# Patient Record
Sex: MALE | Race: WHITE | NOT HISPANIC OR LATINO | Employment: FULL TIME | URBAN - METROPOLITAN AREA
[De-identification: names, ages, dates, MRNs, and addresses within clinical notes are randomized per-mention and may not be internally consistent; named-entity substitution may affect disease eponyms.]

---

## 2018-08-20 ENCOUNTER — OFFICE VISIT (OUTPATIENT)
Dept: INTERNAL MEDICINE | Facility: MEDICAL CENTER | Age: 38
End: 2018-08-20
Payer: COMMERCIAL

## 2018-08-20 VITALS
WEIGHT: 193.2 LBS | OXYGEN SATURATION: 96 % | DIASTOLIC BLOOD PRESSURE: 84 MMHG | BODY MASS INDEX: 27.66 KG/M2 | SYSTOLIC BLOOD PRESSURE: 124 MMHG | HEIGHT: 70 IN | RESPIRATION RATE: 18 BRPM | HEART RATE: 80 BPM | TEMPERATURE: 98.3 F

## 2018-08-20 DIAGNOSIS — Z13.21 ENCOUNTER FOR VITAMIN DEFICIENCY SCREENING: ICD-10-CM

## 2018-08-20 DIAGNOSIS — Z13.0 SCREENING FOR DEFICIENCY ANEMIA: ICD-10-CM

## 2018-08-20 DIAGNOSIS — Z13.220 SCREENING FOR HYPERLIPIDEMIA: ICD-10-CM

## 2018-08-20 DIAGNOSIS — Z00.00 ENCOUNTER FOR PREVENTIVE CARE: ICD-10-CM

## 2018-08-20 DIAGNOSIS — Z13.1 SCREENING FOR DIABETES MELLITUS (DM): ICD-10-CM

## 2018-08-20 DIAGNOSIS — J02.9 SORE THROAT: ICD-10-CM

## 2018-08-20 DIAGNOSIS — R22.1 ENLARGEMENT OF NECK: ICD-10-CM

## 2018-08-20 DIAGNOSIS — Z13.29 SCREENING FOR THYROID DISORDER: ICD-10-CM

## 2018-08-20 LAB
INT CON NEG: NORMAL
INT CON POS: NORMAL
S PYO AG THROAT QL: NEGATIVE

## 2018-08-20 PROCEDURE — 87880 STREP A ASSAY W/OPTIC: CPT | Mod: GC | Performed by: INTERNAL MEDICINE

## 2018-08-20 PROCEDURE — 99204 OFFICE O/P NEW MOD 45 MIN: CPT | Mod: GC | Performed by: INTERNAL MEDICINE

## 2018-08-20 ASSESSMENT — PAIN SCALES - GENERAL: PAINLEVEL: NO PAIN

## 2018-08-20 ASSESSMENT — PATIENT HEALTH QUESTIONNAIRE - PHQ9: CLINICAL INTERPRETATION OF PHQ2 SCORE: 0

## 2018-08-20 NOTE — PATIENT INSTRUCTIONS
-Please get your lab work done  -Order for Ultrasound of Neck/Thyroid provided, please get done  -Follow-up in a few weeks   -If signs or symptoms become worse, please seek medical attention immediately     Thyroid Diseases  Your thyroid is a butterfly-shaped gland in your neck. It is located just above your collarbone. It is one of your endocrine glands, which make hormones. The thyroid helps set your metabolism. Metabolism is how your body gets energy from the foods you eat.   Millions of people have thyroid diseases. Women experience thyroid problems more often than men. In fact, overactive thyroid problems (hyperthyroidism) occur in 1% of all women. If you have a thyroid disease, your body may use energy more slowly or quickly than it should.   Thyroid problems also include an immune disease where your body reacts against your thyroid gland (called thyroiditis). A different problem involves lumps and bumps (called nodules) that develop in the gland. The nodules are usually, but not always, noncancerous.  THE MOST COMMON THYROID PROBLEMS AND CAUSES ARE DISCUSSED BELOW  There are many causes for thyroid problems. Treatment depends upon the exact diagnosis and includes trying to reset your body's metabolism to a normal rate.  Hyperthyroidism  Too much thyroid hormone from an overactive thyroid gland is called hyperthyroidism. In hyperthyroidism, the body's metabolism speeds up. One of the most frequent forms of hyperthyroidism is known as Graves' disease. Graves' disease tends to run in families. Although Graves' is thought to be caused by a problem with the immune system, the exact nature of the genetic problem is unknown.  Hypothyroidism  Too little thyroid hormone from an underactive thyroid gland is called hypothyroidism. In hypothyroidism, the body's metabolism is slowed. Several things can cause this condition. Most causes affect the thyroid gland directly and hurt its ability to make enough hormone.    Rarely, there may be a pituitary gland tumor (located near the base of the brain). The tumor can block the pituitary from producing thyroid-stimulating hormone (TSH). Your body makes TSH to stimulate the thyroid to work properly. If the pituitary does not make enough TSH, the thyroid fails to make enough hormones needed for good health.  Whether the problem is caused by thyroid conditions or by the pituitary gland, the result is that the thyroid is not making enough hormones. Hypothyroidism causes many physical and mental processes to become sluggish. The body consumes less oxygen and produces less body heat.  Thyroid Nodules  A thyroid nodule is a small swelling or lump in the thyroid gland. They are common. These nodules represent either a growth of thyroid tissue or a fluid-filled cyst. Both form a lump in the thyroid gland. Almost half of all people will have tiny thyroid nodules at some point in their lives. Typically, these are not noticeable until they become large and affect normal thyroid size. Larger nodules that are greater than a half inch across (about 1 centimeter) occur in about 5 percent of people.  Although most nodules are not cancerous, people who have them should seek medical care to rule out cancer. Also, some thyroid nodules may produce too much thyroid hormone or become too large. Large nodules or a large gland can interfere with breathing or swallowing or may cause neck discomfort.  Other problems  Other thyroid problems include cancer and thyroiditis. Thyroiditis is a malfunction of the body's immune system. Normally, the immune system works to defend the body against infection and other problems. When the immune system is not working properly, it may mistakenly attack normal cells, tissues, and organs. Examples of autoimmune diseases are Hashimoto's thyroiditis (which causes low thyroid function) and Graves' disease (which causes excess thyroid function).  SYMPTOMS   Symptoms vary greatly  depending upon the exact type of problem with the thyroid.  Hyperthyroidism-is when your thyroid is too active and makes more thyroid hormone than your body needs. The most common cause is Graves' Disease. Too much thyroid hormone can cause some or all of the following symptoms:  · Anxiety.   · Irritability.   · Difficulty sleeping.   · Fatigue.   · A rapid or irregular heartbeat.   · A fine tremor of your hands or fingers.   · An increase in perspiration.   · Sensitivity to heat.   · Weight loss, despite normal food intake.   · Brittle hair.   · Enlargement of your thyroid gland (goiter).   · Light menstrual periods.   · Frequent bowel movements.   Graves' disease can specifically cause eye and skin problems. The skin problems involve reddening and swelling of the skin, often on your shins and on the top of your feet. Eye problems can include the following:  · Excess tearing and sensation of grit or sand in either or both eyes.   · Reddened or inflamed eyes.   · Widening of the space between your eyelids.   · Swelling of the lids and tissues around the eyes.   · Light sensitivity.   · Ulcers on the cornea.   · Double vision.   · Limited eye movements.   · Blurred or reduced vision.   Hypothyroidism- is when your thyroid gland is not active enough. This is more common than hyperthyroidism. Symptoms can vary a lot depending of the severity of the hormone deficiency. Symptoms may develop over a long period of time and can include several of the following:  · Fatigue.   · Sluggishness.   · Increased sensitivity to cold.   · Constipation.   · Pale, dry skin.   · A puffy face.   · Hoarse voice.   · High blood cholesterol level.   · Unexplained weight gain.   · Muscle aches, tenderness and stiffness.   · Pain, stiffness or swelling in your joints.   · Muscle weakness.   · Heavier than normal menstrual periods.   · Brittle fingernails and hair.   · Depression.   Thyroid Nodules - most do not cause signs or symptoms.  Occasionally, some may become so large that you can feel or even see the swelling at the base of your neck. You may realize a lump or swelling is there when you are shaving or putting on makeup. Men might become aware of a nodule when shirt collars suddenly feel too tight.  Some nodules produce too much thyroid hormone. This can produce the same symptoms as hyperthyroidism (see above).  Thyroid nodules are seldom cancerous. However, a nodule is more likely to be malignant (cancerous) if it:  · Grows quickly or feels hard.   · Causes you to become hoarse or to have trouble swallowing or breathing.   · Causes enlarged lymph nodes under your jaw or in your neck.   DIAGNOSIS   Because there are so many possible thyroid conditions, your caregiver may ask for a number of tests. They will do this in order to narrow down the exact diagnosis. These tests can include:  · Blood and antibody tests.   · Special thyroid scans using small, safe amounts of radioactive iodine.   · Ultrasound of the thyroid gland (particularly if there is a nodule or lump).   · Biopsy. This is usually done with a special needle. A needle biopsy is a procedure to obtain a sample of cells from the thyroid. The tissue will be tested in a lab and examined under a microscope.   TREATMENT   Treatment depends on the exact diagnosis.  Hyperthyroidism  · Beta-blockers help relieve many of the symptoms.   · Anti-thyroid medications prevent the thyroid from making excess hormones.   · Radioactive iodine treatment can destroy overactive thyroid cells. The iodine can permanently decrease the amount of hormone produced.   · Surgery to remove the thyroid gland.   · Treatments for eye problems that come from Graves' disease also include medications and special eye surgery, if felt to be appropriate.   Hypothyroidism  Thyroid replacement with levothyroxine is the mainstay of treatment. Treatment with thyroid replacement is usually lifelong and will require monitoring  and adjustment from time to time.  Thyroid Nodules  · Watchful waiting. If a small nodule causes no symptoms or signs of cancer on biopsy, then no treatment may be chosen at first. Re-exam and re-checking blood tests would be the recommended follow-up.   · Anti-thyroid medications or radioactive iodine treatment may be recommended if the nodules produce too much thyroid hormone (see Treatment for Hyperthyroidism above).   · Alcohol ablation. Injections of small amounts of ethyl alcohol (ethanol) can cause a non-cancerous nodule to shrink in size.   · Surgery (see Treatment for Hyperthyroidism above).   HOME CARE INSTRUCTIONS   · Take medications as instructed.   · Follow through on recommended testing.   SEEK MEDICAL CARE IF:   · You feel that you are developing symptoms of Hyperthyroidism or Hypothyroidism as described above.   · You develop a new lump/nodule in the neck/thyroid area that you had not noticed before.   · You feel that you are having side effects from medicines prescribed.   · You develop trouble breathing or swallowing.   SEEK IMMEDIATE MEDICAL CARE IF:   · You develop a fever of 102° F (38.9° C) or higher.   · You develop severe sweating.   · You develop palpitations and/or rapid heart beat.   · You develop shortness of breath.   · You develop nausea and vomiting.   · You develop extreme shakiness.   · You develop agitation.   · You develop lightheadedness or have a fainting episode.   Document Released: 10/14/2008 Document Revised: 03/11/2013 Document Reviewed: 10/14/2008  [employer] Patient Information ©2013 [employer].

## 2018-08-20 NOTE — LETTER
[facility]  Helen Daugherty M.D.  [address]  [place] [address]  Fax: [phone]   Authorization for Release/Disclosure of   Protected Health Information   Name: AJIT RED : 1980 SSN: [id number]   Address: [facility] [address]  [place] [address] Phone:    [phone] (home)    I authorize the entity listed below to release/disclose the PHI below to:   [facility]/Helen Daugherty M.D. and Helen Daugherty M.D.   Provider or Entity Name:     Address   City, State, Zip   Phone:      Fax:     Reason for request: continuity of care   Information to be released:    [  ] LAST COLONOSCOPY,  including any PATH REPORT and follow-up  [  ] LAST FIT/COLOGUARD RESULT [  ] LAST DEXA  [  ] LAST MAMMOGRAM  [  ] LAST PAP  [  ] LAST LABS [  ] RETINA EXAM REPORT  [  ] IMMUNIZATION RECORDS  [  ] Release all info      [  ] Check here and initial the line next to each item to release ALL health information INCLUDING  _____ Care and treatment for drug and / or alcohol abuse  _____ HIV testing, infection status, or AIDS  _____ Genetic Testing    DATES OF SERVICE OR TIME PERIOD TO BE DISCLOSED: _____________  I understand and acknowledge that:  * This Authorization may be revoked at any time by you in writing, except if your health information has already been used or disclosed.  * Your health information that will be used or disclosed as a result of you signing this authorization could be re-disclosed by the recipient. If this occurs, your re-disclosed health information may no longer be protected by State or Federal laws.  * You may refuse to sign this Authorization. Your refusal will not affect your ability to obtain treatment.  * This Authorization becomes effective upon signing and will  on (date) __________.      If no date is indicated, this Authorization will  one (1) year from the signature date.    Name: Ajit Red    Signature:   Date:     2018       PLEASE FAX REQUESTED RECORDS  BACK TO: [phone]

## 2018-08-29 ENCOUNTER — HOSPITAL ENCOUNTER (OUTPATIENT)
Dept: RADIOLOGY | Facility: MEDICAL CENTER | Age: 38
End: 2018-08-29
Attending: STUDENT IN AN ORGANIZED HEALTH CARE EDUCATION/TRAINING PROGRAM
Payer: COMMERCIAL

## 2018-08-29 ENCOUNTER — HOSPITAL ENCOUNTER (OUTPATIENT)
Dept: LAB | Facility: MEDICAL CENTER | Age: 38
End: 2018-08-29
Attending: STUDENT IN AN ORGANIZED HEALTH CARE EDUCATION/TRAINING PROGRAM
Payer: COMMERCIAL

## 2018-08-29 DIAGNOSIS — J02.9 SORE THROAT: ICD-10-CM

## 2018-08-29 DIAGNOSIS — Z13.21 ENCOUNTER FOR VITAMIN DEFICIENCY SCREENING: ICD-10-CM

## 2018-08-29 DIAGNOSIS — R22.1 ENLARGEMENT OF NECK: ICD-10-CM

## 2018-08-29 DIAGNOSIS — Z13.0 SCREENING FOR DEFICIENCY ANEMIA: ICD-10-CM

## 2018-08-29 DIAGNOSIS — Z13.29 SCREENING FOR THYROID DISORDER: ICD-10-CM

## 2018-08-29 DIAGNOSIS — Z13.1 SCREENING FOR DIABETES MELLITUS (DM): ICD-10-CM

## 2018-08-29 DIAGNOSIS — Z00.00 ENCOUNTER FOR PREVENTIVE CARE: ICD-10-CM

## 2018-08-29 DIAGNOSIS — Z13.220 SCREENING FOR HYPERLIPIDEMIA: ICD-10-CM

## 2018-08-29 LAB
25(OH)D3 SERPL-MCNC: 22 NG/ML (ref 30–100)
ALBUMIN SERPL BCP-MCNC: 4.3 G/DL (ref 3.2–4.9)
ALBUMIN/GLOB SERPL: 1.5 G/DL
ALP SERPL-CCNC: 80 U/L (ref 30–99)
ALT SERPL-CCNC: 28 U/L (ref 2–50)
ANION GAP SERPL CALC-SCNC: 8 MMOL/L (ref 0–11.9)
AST SERPL-CCNC: 23 U/L (ref 12–45)
BASOPHILS # BLD AUTO: 0.9 % (ref 0–1.8)
BASOPHILS # BLD: 0.06 K/UL (ref 0–0.12)
BILIRUB SERPL-MCNC: 1.5 MG/DL (ref 0.1–1.5)
BUN SERPL-MCNC: 14 MG/DL (ref 8–22)
CALCIUM SERPL-MCNC: 9 MG/DL (ref 8.5–10.5)
CHLORIDE SERPL-SCNC: 106 MMOL/L (ref 96–112)
CHOLEST SERPL-MCNC: 157 MG/DL (ref 100–199)
CO2 SERPL-SCNC: 24 MMOL/L (ref 20–33)
CREAT SERPL-MCNC: 0.94 MG/DL (ref 0.5–1.4)
EOSINOPHIL # BLD AUTO: 0.59 K/UL (ref 0–0.51)
EOSINOPHIL NFR BLD: 8.5 % (ref 0–6.9)
ERYTHROCYTE [DISTWIDTH] IN BLOOD BY AUTOMATED COUNT: 39.5 FL (ref 35.9–50)
GLOBULIN SER CALC-MCNC: 2.9 G/DL (ref 1.9–3.5)
GLUCOSE SERPL-MCNC: 84 MG/DL (ref 65–99)
HCT VFR BLD AUTO: 48.1 % (ref 42–52)
HDLC SERPL-MCNC: 34 MG/DL
HGB BLD-MCNC: 17 G/DL (ref 14–18)
HIV 1+2 AB+HIV1 P24 AG SERPL QL IA: NON REACTIVE
IMM GRANULOCYTES # BLD AUTO: 0.03 K/UL (ref 0–0.11)
IMM GRANULOCYTES NFR BLD AUTO: 0.4 % (ref 0–0.9)
LDLC SERPL CALC-MCNC: 82 MG/DL
LYMPHOCYTES # BLD AUTO: 2.05 K/UL (ref 1–4.8)
LYMPHOCYTES NFR BLD: 29.5 % (ref 22–41)
MCH RBC QN AUTO: 30.9 PG (ref 27–33)
MCHC RBC AUTO-ENTMCNC: 35.3 G/DL (ref 33.7–35.3)
MCV RBC AUTO: 87.3 FL (ref 81.4–97.8)
MONOCYTES # BLD AUTO: 0.48 K/UL (ref 0–0.85)
MONOCYTES NFR BLD AUTO: 6.9 % (ref 0–13.4)
NEUTROPHILS # BLD AUTO: 3.73 K/UL (ref 1.82–7.42)
NEUTROPHILS NFR BLD: 53.8 % (ref 44–72)
NRBC # BLD AUTO: 0 K/UL
NRBC BLD-RTO: 0 /100 WBC
PLATELET # BLD AUTO: 222 K/UL (ref 164–446)
PMV BLD AUTO: 11.6 FL (ref 9–12.9)
POTASSIUM SERPL-SCNC: 4.1 MMOL/L (ref 3.6–5.5)
PROT SERPL-MCNC: 7.2 G/DL (ref 6–8.2)
RBC # BLD AUTO: 5.51 M/UL (ref 4.7–6.1)
SODIUM SERPL-SCNC: 138 MMOL/L (ref 135–145)
TRIGL SERPL-MCNC: 206 MG/DL (ref 0–149)
TSH SERPL DL<=0.005 MIU/L-ACNC: 1.93 UIU/ML (ref 0.38–5.33)
WBC # BLD AUTO: 6.9 K/UL (ref 4.8–10.8)

## 2018-08-29 PROCEDURE — 80053 COMPREHEN METABOLIC PANEL: CPT

## 2018-08-29 PROCEDURE — 87389 HIV-1 AG W/HIV-1&-2 AB AG IA: CPT

## 2018-08-29 PROCEDURE — 80061 LIPID PANEL: CPT

## 2018-08-29 PROCEDURE — 84443 ASSAY THYROID STIM HORMONE: CPT

## 2018-08-29 PROCEDURE — 85025 COMPLETE CBC W/AUTO DIFF WBC: CPT

## 2018-08-29 PROCEDURE — 82306 VITAMIN D 25 HYDROXY: CPT

## 2018-08-29 PROCEDURE — 76536 US EXAM OF HEAD AND NECK: CPT

## 2018-08-29 PROCEDURE — 36415 COLL VENOUS BLD VENIPUNCTURE: CPT

## 2018-10-09 ENCOUNTER — OFFICE VISIT (OUTPATIENT)
Dept: INTERNAL MEDICINE | Facility: MEDICAL CENTER | Age: 38
End: 2018-10-09
Payer: COMMERCIAL

## 2018-10-09 VITALS
HEIGHT: 70 IN | BODY MASS INDEX: 28.26 KG/M2 | TEMPERATURE: 98 F | OXYGEN SATURATION: 94 % | WEIGHT: 197.4 LBS | HEART RATE: 69 BPM | SYSTOLIC BLOOD PRESSURE: 110 MMHG | DIASTOLIC BLOOD PRESSURE: 73 MMHG

## 2018-10-09 DIAGNOSIS — E55.9 VITAMIN D DEFICIENCY: ICD-10-CM

## 2018-10-09 DIAGNOSIS — E78.1 HIGH TRIGLYCERIDES: ICD-10-CM

## 2018-10-09 PROCEDURE — 99214 OFFICE O/P EST MOD 30 MIN: CPT | Mod: GC | Performed by: INTERNAL MEDICINE

## 2018-10-09 ASSESSMENT — PAIN SCALES - GENERAL: PAINLEVEL: NO PAIN

## 2018-10-09 NOTE — PATIENT INSTRUCTIONS
-Please take Vit D supplement 2000 units daily   -Cut hard carb foods from diet, avoid energy drinks, sodas, beverages with sugar as much as possible  - Watch diet- cut red meats, donuts, and also increase exercise regime as this will help with lipid levels    Fat and Cholesterol Restricted Diet  High levels of fat and cholesterol in your blood may lead to various health problems, such as diseases of the heart, blood vessels, gallbladder, liver, and pancreas. Fats are concentrated sources of energy that come in various forms. Certain types of fat, including saturated fat, may be harmful in excess. Cholesterol is a substance needed by your body in small amounts. Your body makes all the cholesterol it needs. Excess cholesterol comes from the food you eat.  When you have high levels of cholesterol and saturated fat in your blood, health problems can develop because the excess fat and cholesterol will gather along the walls of your blood vessels, causing them to narrow. Choosing the right foods will help you control your intake of fat and cholesterol. This will help keep the levels of these substances in your blood within normal limits and reduce your risk of disease.  WHAT IS MY PLAN?  Your health care provider recommends that you:  · Get no more than __________ % of the total calories in your daily diet from fat.  · Limit your intake of saturated fat to less than ______% of your total calories each day.  · Limit the amount of cholesterol in your diet to less than _________mg per day.  WHAT TYPES OF FAT SHOULD I CHOOSE?  · Choose healthy fats more often. Choose monounsaturated and polyunsaturated fats, such as olive and canola oil, flaxseeds, walnuts, almonds, and seeds.  · Eat more omega-3 fats. Good choices include salmon, mackerel, sardines, tuna, flaxseed oil, and ground flaxseeds. Aim to eat fish at least two times a week.  · Limit saturated fats. Saturated fats are primarily found in animal products, such as  "meats, butter, and cream. Plant sources of saturated fats include palm oil, palm kernel oil, and coconut oil.  · Avoid foods with partially hydrogenated oils in them. These contain trans fats. Examples of foods that contain trans fats are stick margarine, some tub margarines, cookies, crackers, and other baked goods.  WHAT GENERAL GUIDELINES DO I NEED TO FOLLOW?  These guidelines for healthy eating will help you control your intake of fat and cholesterol:  · Check food labels carefully to identify foods with trans fats or high amounts of saturated fat.  · Fill one half of your plate with vegetables and green salads.  · Fill one fourth of your plate with whole grains. Look for the word \"whole\" as the first word in the ingredient list.  · Fill one fourth of your plate with lean protein foods.  · Limit fruit to two servings a day. Choose fruit instead of juice.  · Eat more foods that contain soluble fiber. Examples of foods that contain this type of fiber are apples, broccoli, carrots, beans, peas, and barley. Aim to get 20-30 g of fiber per day.  · Eat more home-cooked food and less restaurant, buffet, and fast food.  · Limit or avoid alcohol.  · Limit foods high in starch and sugar.  · Limit fried foods.  · Cook foods using methods other than frying. Baking, boiling, grilling, and broiling are all great options.  · Lose weight if you are overweight. Losing just 5-10% of your initial body weight can help your overall health and prevent diseases such as diabetes and heart disease.  WHAT FOODS CAN I EAT?  Grains  Whole grains, such as whole wheat or whole grain breads, crackers, cereals, and pasta. Unsweetened oatmeal, bulgur, barley, quinoa, or brown rice. Corn or whole wheat flour tortillas.  Vegetables  Fresh or frozen vegetables (raw, steamed, roasted, or grilled). Green salads.  Fruits  All fresh, canned (in natural juice), or frozen fruits.  Meat and Other Protein Products  Ground beef (85% or leaner), grass-fed " beef, or beef trimmed of fat. Skinless chicken or turkey. Ground chicken or turkey. Pork trimmed of fat. All fish and seafood. Eggs. Dried beans, peas, or lentils. Unsalted nuts or seeds. Unsalted canned or dry beans.  Dairy  Low-fat dairy products, such as skim or 1% milk, 2% or reduced-fat cheeses, low-fat ricotta or cottage cheese, or plain low-fat yogurt.  Fats and Oils  Tub margarines without trans fats. Light or reduced-fat mayonnaise and salad dressings. Avocado. Olive, canola, sesame, or safflower oils. Natural peanut or almond butter (choose ones without added sugar and oil).  The items listed above may not be a complete list of recommended foods or beverages. Contact your dietitian for more options.  WHAT FOODS ARE NOT RECOMMENDED?  Grains  White bread. White pasta. White rice. Cornbread. Bagels, pastries, and croissants. Crackers that contain trans fat.  Vegetables  White potatoes. Corn. Creamed or fried vegetables. Vegetables in a cheese sauce.  Fruits  Dried fruits. Canned fruit in light or heavy syrup. Fruit juice.  Meat and Other Protein Products  Fatty cuts of meat. Ribs, chicken wings, snow, sausage, bologna, salami, chitterlings, fatback, hot dogs, bratwurst, and packaged luncheon meats. Liver and organ meats.  Dairy  Whole or 2% milk, cream, half-and-half, and cream cheese. Whole milk cheeses. Whole-fat or sweetened yogurt. Full-fat cheeses. Nondairy creamers and whipped toppings. Processed cheese, cheese spreads, or cheese curds.  Sweets and Desserts  Corn syrup, sugars, honey, and molasses. Candy. Jam and jelly. Syrup. Sweetened cereals. Cookies, pies, cakes, donuts, muffins, and ice cream.  Fats and Oils  Butter, stick margarine, lard, shortening, ghee, or snow fat. Coconut, palm kernel, or palm oils.  Beverages  Alcohol. Sweetened drinks (such as sodas, lemonade, and fruit drinks or punches).  The items listed above may not be a complete list of foods and beverages to avoid. Contact your  dietitian for more information.     This information is not intended to replace advice given to you by your health care provider. Make sure you discuss any questions you have with your health care provider.     Document Released: 12/18/2006 Document Revised: 01/08/2016 Document Reviewed: 03/18/2015  [employer] Interactive Patient Education ©2016 [employer]      Heart-Healthy Eating Plan  Many factors influence your heart health, including eating and exercise habits. Heart (coronary) risk increases with abnormal blood fat (lipid) levels. Heart-healthy meal planning includes limiting unhealthy fats, increasing healthy fats, and making other small dietary changes. This includes maintaining a healthy body weight to help keep lipid levels within a normal range.  What is my plan?  Your health care provider recommends that you:  · Get no more than _________% of the total calories in your daily diet from fat.  · Limit your intake of saturated fat to less than _________% of your total calories each day.  · Limit the amount of cholesterol in your diet to less than _________ mg per day.  What types of fat should I choose?  · Choose healthy fats more often. Choose monounsaturated and polyunsaturated fats, such as olive oil and canola oil, flaxseeds, walnuts, almonds, and seeds.  · Eat more omega-3 fats. Good choices include salmon, mackerel, sardines, tuna, flaxseed oil, and ground flaxseeds. Aim to eat fish at least two times each week.  · Limit saturated fats. Saturated fats are primarily found in animal products, such as meats, butter, and cream. Plant sources of saturated fats include palm oil, palm kernel oil, and coconut oil.  · Avoid foods with partially hydrogenated oils in them. These contain trans fats. Examples of foods that contain trans fats are stick margarine, some tub margarines, cookies, crackers, and other baked goods.  What general guidelines do I need to follow?  · Check food labels carefully to identify  "foods with trans fats or high amounts of saturated fat.  · Fill one half of your plate with vegetables and green salads. Eat 4-5 servings of vegetables per day. A serving of vegetables equals 1 cup of raw leafy vegetables, ½ cup of raw or cooked cut-up vegetables, or ½ cup of vegetable juice.  · Fill one fourth of your plate with whole grains. Look for the word \"whole\" as the first word in the ingredient list.  · Fill one fourth of your plate with lean protein foods.  · Eat 4-5 servings of fruit per day. A serving of fruit equals one medium whole fruit, ¼ cup of dried fruit, ½ cup of fresh, frozen, or canned fruit, or ½ cup of 100% fruit juice.  · Eat more foods that contain soluble fiber. Examples of foods that contain this type of fiber are apples, broccoli, carrots, beans, peas, and barley. Aim to get 20-30 g of fiber per day.  · Eat more home-cooked food and less restaurant, buffet, and fast food.  · Limit or avoid alcohol.  · Limit foods that are high in starch and sugar.  · Avoid fried foods.  · Cook foods by using methods other than frying. Baking, boiling, grilling, and broiling are all great options. Other fat-reducing suggestions include:  ¨ Removing the skin from poultry.  ¨ Removing all visible fats from meats.  ¨ Skimming the fat off of stews, soups, and gravies before serving them.  ¨ Steaming vegetables in water or broth.  · Lose weight if you are overweight. Losing just 5-10% of your initial body weight can help your overall health and prevent diseases such as diabetes and heart disease.  · Increase your consumption of nuts, legumes, and seeds to 4-5 servings per week. One serving of dried beans or legumes equals ½ cup after being cooked, one serving of nuts equals 1½ ounces, and one serving of seeds equals ½ ounce or 1 tablespoon.  · You may need to monitor your salt (sodium) intake, especially if you have high blood pressure. Talk with your health care provider or dietitian to get more information " about reducing sodium.  What foods can I eat?  Grains   Breads, including Lebanese, white, maureen, wheat, raisin, rye, oatmeal, and Italian. Tortillas that are neither fried nor made with lard or trans fat. Low-fat rolls, including hotdog and hamburger buns and English muffins. Biscuits. Muffins. Waffles. Pancakes. Light popcorn. Whole-grain cereals. Flatbread. Shabnam toast. Pretzels. Breadsticks. Rusks. Low-fat snacks and crackers, including oyster, saltine, matzo, ana, animal, and rye. Rice and pasta, including brown rice and those that are made with whole wheat.  Vegetables   All vegetables.  Fruits   All fruits, but limit coconut.  Meats and Other Protein Sources   Lean, well-trimmed beef, veal, pork, and lamb. Chicken and turkey without skin. All fish and shellfish. Wild duck, rabbit, pheasant, and venison. Egg whites or low-cholesterol egg substitutes. Dried beans, peas, lentils, and tofu. Seeds and most nuts.  Dairy   Low-fat or nonfat cheeses, including ricotta, string, and mozzarella. Skim or 1% milk that is liquid, powdered, or evaporated. Buttermilk that is made with low-fat milk. Nonfat or low-fat yogurt.  Beverages   Mineral water. Diet carbonated beverages.  Sweets and Desserts   Sherbets and fruit ices. Honey, jam, marmalade, jelly, and syrups. Meringues and gelatins. Pure sugar candy, such as hard candy, jelly beans, gumdrops, mints, marshmallows, and small amounts of dark chocolate. Fred food cake.  Eat all sweets and desserts in moderation.  Fats and Oils   Nonhydrogenated (trans-free) margarines. Vegetable oils, including soybean, sesame, sunflower, olive, peanut, safflower, corn, canola, and cottonseed. Salad dressings or mayonnaise that are made with a vegetable oil. Limit added fats and oils that you use for cooking, baking, salads, and as spreads.  Other   Cocoa powder. Coffee and tea. All seasonings and condiments.  The items listed above may not be a complete list of recommended foods or  beverages. Contact your dietitian for more options.   What foods are not recommended?  Grains   Breads that are made with saturated or trans fats, oils, or whole milk. Croissants. Butter rolls. Cheese breads. Sweet rolls. Donuts. Buttered popcorn. Chow mein noodles. High-fat crackers, such as cheese or butter crackers.  Meats and Other Protein Sources   Fatty meats, such as hotdogs, short ribs, sausage, spareribs, snow, ribeye roast or steak, and mutton. High-fat deli meats, such as salami and bologna. Caviar. Domestic duck and goose. Organ meats, such as kidney, liver, sweetbreads, brains, gizzard, chitterlings, and heart.  Dairy   Cream, sour cream, cream cheese, and creamed cottage cheese. Whole milk cheeses, including blue (donte), [place], Brie, Viet, American, [place]i, Swiss, cheddar, Camembert, and [place]. Whole or 2% milk that is liquid, evaporated, or condensed. Whole buttermilk. Cream sauce or high-fat cheese sauce. Yogurt that is made from whole milk.  Beverages   Regular sodas and drinks with added sugar.  Sweets and Desserts   Frosting. Pudding. Cookies. Cakes other than alma food cake. Candy that has milk chocolate or white chocolate, hydrogenated fat, butter, coconut, or unknown ingredients. Buttered syrups. Full-fat ice cream or ice cream drinks.  Fats and Oils   Gravy that has suet, meat fat, or shortening. Cocoa butter, hydrogenated oils, palm oil, coconut oil, palm kernel oil. These can often be found in baked products, candy, fried foods, nondairy creamers, and whipped toppings. Solid fats and shortenings, including snow fat, salt pork, lard, and butter. Nondairy cream substitutes, such as coffee creamers and sour cream substitutes. Salad dressings that are made of unknown oils, cheese, or sour cream.  The items listed above may not be a complete list of foods and beverages to avoid. Contact your dietitian for more information.   This information is not intended to replace advice given  to you by your health care provider. Make sure you discuss any questions you have with your health care provider.  Document Released: 09/26/2009 Document Revised: 07/07/2017 Document Reviewed: 06/11/2015  [employer] Interactive Patient Education © 2017 [employer]

## 2018-10-10 NOTE — PROGRESS NOTES
Established Patient    Semaj presents today with the following:    CC:   Patient presents for one-month follow-up visit and to go over labs      HPI:   Patient is a 38-year-old male who presents today for a one-month follow-up visit and also to go over his lab results.  At the last office visit, patient had a sore throat and also a complaint of intermittent enlargement in his neck.  Patient states that since that visit his sore throat has resolved and that there was no recurring enlargement or swellings of his neck that he noticed.  He states that otherwise he is feeling fine at this time with no other complaints.  Patient states that he is taking vitamin D supplementation for over a year and that he has recently stopped after a few months of recent use, however restarted as his recent vitamin D lab levels were low.  Discussed the importance of diet and exercise with patient, he states that he will work on his diet and will try to cut out red meat, beer, donuts and other high carb foods from his diet.  He expresses understanding at this time on the importance of diet and lifestyle modifications to help with his elevated triglyceride levels and recent lipid panel lab work.        There are no active problems to display for this patient.      No current outpatient prescriptions on file.     No current facility-administered medications for this visit.        Social History     Social History   • Marital status:      Spouse name: N/A   • Number of children: N/A   • Years of education: N/A     Occupational History   • Not on file.     Social History Main Topics   • Smoking status: Never Smoker   • Smokeless tobacco: Never Used   • Alcohol use Yes      Comment: 2-3 drinks every other week   • Drug use: No   • Sexual activity: Yes     Partners: Female      Comment: with wife     Other Topics Concern   • Not on file     Social History Narrative   • No narrative on file       Family History   Problem Relation Age of  "Onset   • Depression Mother    • Hyperlipidemia Father    • Heart Disease Brother    • Cancer Maternal Grandmother    • Heart Disease Maternal Grandmother    • Hyperlipidemia Maternal Grandmother    • Depression Maternal Grandmother    • Hyperlipidemia Maternal Grandfather    • Hyperlipidemia Paternal Grandmother    • Heart Disease Paternal Grandfather    • Hyperlipidemia Paternal Grandfather        ROS: As per HPI. Additional pertinent symptoms as noted below.    All others negative    /73 (BP Location: Left arm, Patient Position: Sitting)   Pulse 69   Temp 36.7 °C (98 °F)   Ht 1.79 m (5' 10.47\")   Wt 89.5 kg (197 lb 6.4 oz)   SpO2 94%   BMI 27.95 kg/m²     Physical Exam  General:  Alert and oriented, No apparent distress.    Eyes: Pupils equal and reactive. No scleral icterus.    Throat: Clear no erythema or exudates noted.    Neck: Supple. No lymphadenopathy noted. Thyroid not enlarged.  No enlargement/swelling appreciated bilaterally in the neck.  Neck is symmetrical in size, nontender to palpation.    Lungs: Clear to auscultation and percussion bilaterally.  Normal breathing effort.    Cardiovascular: Regular rate and rhythm. No murmurs, rubs or gallops.    Abdomen:  Benign. No rebound or guarding noted.    Extremities: No clubbing, cyanosis, edema.    Skin: Clear. No rash or suspicious skin lesions noted.        Assessment and Plan    1. Vit D deficiency  - Patient has history of vitamin D deficiency,  states that he is taking vitamin D supplementation for over a year and that he has recently stopped after a few months of recent use, however restarted recently when he discovered that his vitamin D lab levels were low.  - Labwork 25 hydroxy vitamin D level was 22 (8/29/2018)  - Instructed patient to take vitamin D supplementation 2000 units daily  - Will consider vitamin D3 50,000 units if persistent low vitamin D levels after 6 months  - Continue to monitor at this time      2. High " triglycerides  - Patient's recent lipid panel lab work revealed triglycerides level of 206  - Patient's age is 38 and he has no history of heart disease so no urgent need to consider statin medication at this time  - Advised patient's to cut carbs from his diet as even simple carbohydrates will drive up triglyceride levels, to avoid energy drinks, sodas, any sugary beverages, to reduce red meat intake  - Provided patient with educational materials regarding diet and improving food choices  - Patient is in agreement at this time to continue with lifestyle modifications and work on his diet and also increase exercise  - Will continue to monitor at this time      Signed by: Helen Daugherty M.D.

## 2019-09-06 ENCOUNTER — HOSPITAL ENCOUNTER (EMERGENCY)
Facility: MEDICAL CENTER | Age: 39
End: 2019-09-07
Attending: EMERGENCY MEDICINE
Payer: COMMERCIAL

## 2019-09-06 DIAGNOSIS — R10.11 RIGHT UPPER QUADRANT ABDOMINAL PAIN: ICD-10-CM

## 2019-09-06 LAB
BASOPHILS # BLD AUTO: 0.6 % (ref 0–1.8)
BASOPHILS # BLD: 0.09 K/UL (ref 0–0.12)
EOSINOPHIL # BLD AUTO: 0.32 K/UL (ref 0–0.51)
EOSINOPHIL NFR BLD: 2.2 % (ref 0–6.9)
ERYTHROCYTE [DISTWIDTH] IN BLOOD BY AUTOMATED COUNT: 36 FL (ref 35.9–50)
HCT VFR BLD AUTO: 48.6 % (ref 42–52)
HGB BLD-MCNC: 17.7 G/DL (ref 14–18)
IMM GRANULOCYTES # BLD AUTO: 0.05 K/UL (ref 0–0.11)
IMM GRANULOCYTES NFR BLD AUTO: 0.3 % (ref 0–0.9)
LYMPHOCYTES # BLD AUTO: 2.47 K/UL (ref 1–4.8)
LYMPHOCYTES NFR BLD: 17.2 % (ref 22–41)
MCH RBC QN AUTO: 30.8 PG (ref 27–33)
MCHC RBC AUTO-ENTMCNC: 36.4 G/DL (ref 33.7–35.3)
MCV RBC AUTO: 84.7 FL (ref 81.4–97.8)
MONOCYTES # BLD AUTO: 0.69 K/UL (ref 0–0.85)
MONOCYTES NFR BLD AUTO: 4.8 % (ref 0–13.4)
NEUTROPHILS # BLD AUTO: 10.72 K/UL (ref 1.82–7.42)
NEUTROPHILS NFR BLD: 74.9 % (ref 44–72)
NRBC # BLD AUTO: 0 K/UL
NRBC BLD-RTO: 0 /100 WBC
PLATELET # BLD AUTO: 276 K/UL (ref 164–446)
PMV BLD AUTO: 10.2 FL (ref 9–12.9)
RBC # BLD AUTO: 5.74 M/UL (ref 4.7–6.1)
WBC # BLD AUTO: 14.3 K/UL (ref 4.8–10.8)

## 2019-09-06 PROCEDURE — 93005 ELECTROCARDIOGRAM TRACING: CPT | Performed by: EMERGENCY MEDICINE

## 2019-09-06 PROCEDURE — 83690 ASSAY OF LIPASE: CPT

## 2019-09-06 PROCEDURE — 85025 COMPLETE CBC W/AUTO DIFF WBC: CPT

## 2019-09-06 PROCEDURE — 93005 ELECTROCARDIOGRAM TRACING: CPT

## 2019-09-06 PROCEDURE — 99285 EMERGENCY DEPT VISIT HI MDM: CPT

## 2019-09-06 PROCEDURE — 80053 COMPREHEN METABOLIC PANEL: CPT

## 2019-09-06 PROCEDURE — 700111 HCHG RX REV CODE 636 W/ 250 OVERRIDE (IP): Performed by: EMERGENCY MEDICINE

## 2019-09-06 PROCEDURE — 96374 THER/PROPH/DIAG INJ IV PUSH: CPT

## 2019-09-06 RX ORDER — ONDANSETRON 2 MG/ML
4 INJECTION INTRAMUSCULAR; INTRAVENOUS ONCE
Status: COMPLETED | OUTPATIENT
Start: 2019-09-07 | End: 2019-09-07

## 2019-09-06 RX ADMIN — FAMOTIDINE 20 MG: 10 INJECTION INTRAVENOUS at 23:43

## 2019-09-06 ASSESSMENT — PAIN DESCRIPTION - DESCRIPTORS: DESCRIPTORS: BURNING;SHARP

## 2019-09-07 ENCOUNTER — APPOINTMENT (OUTPATIENT)
Dept: RADIOLOGY | Facility: MEDICAL CENTER | Age: 39
End: 2019-09-07
Attending: EMERGENCY MEDICINE
Payer: COMMERCIAL

## 2019-09-07 VITALS
TEMPERATURE: 98 F | DIASTOLIC BLOOD PRESSURE: 74 MMHG | BODY MASS INDEX: 26.93 KG/M2 | RESPIRATION RATE: 18 BRPM | SYSTOLIC BLOOD PRESSURE: 112 MMHG | HEIGHT: 72 IN | OXYGEN SATURATION: 96 % | WEIGHT: 198.85 LBS | HEART RATE: 61 BPM

## 2019-09-07 LAB
ALBUMIN SERPL BCP-MCNC: 4.7 G/DL (ref 3.2–4.9)
ALBUMIN/GLOB SERPL: 1.5 G/DL
ALP SERPL-CCNC: 87 U/L (ref 30–99)
ALT SERPL-CCNC: 36 U/L (ref 2–50)
ANION GAP SERPL CALC-SCNC: 15 MMOL/L (ref 0–11.9)
AST SERPL-CCNC: 22 U/L (ref 12–45)
BILIRUB SERPL-MCNC: 2 MG/DL (ref 0.1–1.5)
BUN SERPL-MCNC: 15 MG/DL (ref 8–22)
CALCIUM SERPL-MCNC: 9.7 MG/DL (ref 8.5–10.5)
CHLORIDE SERPL-SCNC: 103 MMOL/L (ref 96–112)
CO2 SERPL-SCNC: 20 MMOL/L (ref 20–33)
CREAT SERPL-MCNC: 1.07 MG/DL (ref 0.5–1.4)
EKG IMPRESSION: NORMAL
GLOBULIN SER CALC-MCNC: 3.1 G/DL (ref 1.9–3.5)
GLUCOSE SERPL-MCNC: 140 MG/DL (ref 65–99)
LIPASE SERPL-CCNC: 30 U/L (ref 11–82)
POTASSIUM SERPL-SCNC: 3.4 MMOL/L (ref 3.6–5.5)
PROT SERPL-MCNC: 7.8 G/DL (ref 6–8.2)
SODIUM SERPL-SCNC: 138 MMOL/L (ref 135–145)

## 2019-09-07 PROCEDURE — 96375 TX/PRO/DX INJ NEW DRUG ADDON: CPT

## 2019-09-07 PROCEDURE — 700111 HCHG RX REV CODE 636 W/ 250 OVERRIDE (IP): Performed by: EMERGENCY MEDICINE

## 2019-09-07 PROCEDURE — 76705 ECHO EXAM OF ABDOMEN: CPT

## 2019-09-07 RX ORDER — MORPHINE SULFATE 4 MG/ML
4 INJECTION, SOLUTION INTRAMUSCULAR; INTRAVENOUS ONCE
Status: COMPLETED | OUTPATIENT
Start: 2019-09-07 | End: 2019-09-07

## 2019-09-07 RX ORDER — FAMOTIDINE 20 MG/1
20 TABLET, FILM COATED ORAL 2 TIMES DAILY
Qty: 60 TAB | Refills: 0 | Status: SHIPPED | OUTPATIENT
Start: 2019-09-07 | End: 2019-11-05

## 2019-09-07 RX ADMIN — ONDANSETRON 4 MG: 2 INJECTION INTRAMUSCULAR; INTRAVENOUS at 00:03

## 2019-09-07 RX ADMIN — MORPHINE SULFATE 4 MG: 4 INJECTION INTRAVENOUS at 00:04

## 2019-09-07 NOTE — ED TRIAGE NOTES
"Pt to triage via w/c with family. Pt c/o \"burning/ sharp\" epigastric pain/ diffuse abd pain x 2-3 hours. + n/v, denies diarrhea or back pain. Pt taken back for EKG. Pt in obvious discomfort. Charge RN notified.     "

## 2019-09-07 NOTE — ED NOTES
Discharge instructions gone over with pt. Pt verbalized understanding. All questions answered. Pt educated on prescription given. Pt educated on s/sx to return to ED. Pt left ambulatory with steady gait.

## 2019-09-07 NOTE — ED PROVIDER NOTES
ER Provider Note     Scribed for Miguel Alonso M.D. by Margoth Ramos. 9/6/2019, 11:39 PM.    Primary Care Provider: Helen Daugherty M.D.  Means of Arrival: Walk in   History obtained from: Patient  History limited by: None     CHIEF COMPLAINT  Chief Complaint   Patient presents with   • Epigastric Pain   • Abdominal Pain       HPI  Semaj Jensen is a 39 y.o. male who presents to the Emergency Department abdominal pain onset tonight.  He has experienced similar abdominal pain happened before but attributes it to acid reflux. His wife notes that he has some associated tingling in his arms and legs. He had chinese food for dinner but nothing abnormal for him. Additionally his wife and son ate the same food with no reaction. Denies any history of abdominal surgery. Denies dysuria. He is typically very healthy.     REVIEW OF SYSTEMS  See HPI for further details. All other systems are negative.     PAST MEDICAL HISTORY   None pertinent    SURGICAL HISTORY  patient denies any surgical history    SOCIAL HISTORY  Social History     Tobacco Use   • Smoking status: Never Smoker   • Smokeless tobacco: Never Used   Substance Use Topics   • Alcohol use: Yes     Comment: 2-3 drinks every other week   • Drug use: Yes     Types: Inhaled     Comment: marijuana      Social History     Substance and Sexual Activity   Drug Use Yes   • Types: Inhaled    Comment: marijuana       FAMILY HISTORY  Family History   Problem Relation Age of Onset   • Depression Mother    • Hyperlipidemia Father    • Heart Disease Brother    • Cancer Maternal Grandmother    • Heart Disease Maternal Grandmother    • Hyperlipidemia Maternal Grandmother    • Depression Maternal Grandmother    • Hyperlipidemia Maternal Grandfather    • Hyperlipidemia Paternal Grandmother    • Heart Disease Paternal Grandfather    • Hyperlipidemia Paternal Grandfather        CURRENT MEDICATIONS  Home Medications    **Home medications have not yet been reviewed for this  encounter**         ALLERGIES  No Known Allergies    PHYSICAL EXAM  VITAL SIGNS: /80   Pulse 86   Temp 36.7 °C (98 °F) (Temporal)   Resp 20   Ht 1.829 m (6')   Wt 90.2 kg (198 lb 13.7 oz)   SpO2 100%   BMI 26.97 kg/m²      Constitutional: Mild distress. Alert in no apparent distress.  HENT: No signs of trauma, Bilateral external ears normal, Nose normal.   Eyes: Pupils are equal and reactive, Conjunctiva normal, Non-icteric.   Neck: Normal range of motion, No tenderness, Supple, No stridor.   Lymphatic: No lymphadenopathy noted.   Cardiovascular: Regular rate and rhythm, no murmurs.   Thorax & Lungs: Normal breath sounds, No respiratory distress, No wheezing, No chest tenderness.   Abdomen: right upper quadrant and epigastric tendernes with palpation.  Bowel sounds normal, Soft, No tenderness, No masses, No pulsatile masses. No peritoneal signs.  Skin: Warm, Dry, No erythema, No rash.   Back: No bony tenderness, No CVA tenderness.   Extremities: Intact distal pulses, No edema, No tenderness, No cyanosis.  Musculoskeletal: Good range of motion in all major joints. No tenderness to palpation or major deformities noted.   Neurologic: Alert , Normal motor function, Normal sensory function, No focal deficits noted.   Psychiatric: Affect normal, Judgment normal, Mood normal.     DIAGNOSTIC STUDIES / PROCEDURES    EKG Interpretation:  Interpreted by me    Rhythm:  Normal sinus rhythm   Rate: 82  Axis: normal  Ectopy: none  Conduction: normal  ST Segments: no acute change  T Waves: no acute change  Q Waves: none  Clinical Impression: Normal EKG without acute changes     LABS  Labs Reviewed   CBC WITH DIFFERENTIAL - Abnormal; Notable for the following components:       Result Value    WBC 14.3 (*)     MCHC 36.4 (*)     Neutrophils-Polys 74.90 (*)     Lymphocytes 17.20 (*)     Neutrophils (Absolute) 10.72 (*)     All other components within normal limits   COMP METABOLIC PANEL - Abnormal; Notable for the  following components:    Potassium 3.4 (*)     Anion Gap 15.0 (*)     Glucose 140 (*)     Total Bilirubin 2.0 (*)     All other components within normal limits   LIPASE   ESTIMATED GFR   URINALYSIS,CULTURE IF INDICATED     All labs reviewed by me.  US-RUQ   Final Result      1. Echogenic liver, most commonly hepatic steatosis.      2. A 4 mm focus in the gallbladder could relate to adherent stone or small polyp.               COURSE & MEDICAL DECISION MAKING  Pertinent Labs & Imaging studies reviewed. (See chart for details)    This is a 39 y.o. male that presents with abdominal pain.  This could represent pancreatitis versus gastritis versus cholecystitis.  Right upper quadrant ultrasound and treat the patient with Pepcid and Zofran and reassess per    11:39 PM - Patient seen and examined at bedside. I discussed with the patient that I think his pain is related to his gallbladder but we will due further testing to reach a diagnosis. Ordered US RUQ, estimated GFR, CBC w/ diff, CMP, lipase, UA and EKG.  Patient will be medicated with morphine 4 mg/mL, Zofran 4 mg, and Pepcid 20 mg for his symptoms.       Patient's ultrasound is negative.  Patient is well-appearing at this time.  We will send her home with Pepcid.  This could present stomach lining issues.  We will follow-up with the primary care physician.  There is some sludge in the gallbladder but otherwise he is well-appearing    FINAL IMPRESSION  1. Right upper quadrant abdominal pain          Margoth ORTEGA (David), am scribing for, and in the presence of, Miguel Alonso M.D..    Electronically signed by: Margoth Ramos (David), 9/6/2019    Miguel ORTEGA M.D. personally performed the services described in this documentation, as scribed by Margoth Ramos in my presence, and it is both accurate and complete. C    The note accurately reflects work and decisions made by me.  Miguel Alonso  9/7/2019  2:27 AM

## 2019-10-24 ENCOUNTER — HOSPITAL ENCOUNTER (OUTPATIENT)
Dept: LAB | Facility: MEDICAL CENTER | Age: 39
End: 2019-10-24
Attending: STUDENT IN AN ORGANIZED HEALTH CARE EDUCATION/TRAINING PROGRAM
Payer: COMMERCIAL

## 2019-10-24 LAB
25(OH)D3 SERPL-MCNC: 16 NG/ML (ref 30–100)
ALBUMIN SERPL BCP-MCNC: 4.5 G/DL (ref 3.2–4.9)
ALBUMIN/GLOB SERPL: 1.3 G/DL
ALP SERPL-CCNC: 90 U/L (ref 30–99)
ALT SERPL-CCNC: 39 U/L (ref 2–50)
ANION GAP SERPL CALC-SCNC: 8 MMOL/L (ref 0–11.9)
AST SERPL-CCNC: 30 U/L (ref 12–45)
BASOPHILS # BLD AUTO: 0.9 % (ref 0–1.8)
BASOPHILS # BLD: 0.06 K/UL (ref 0–0.12)
BILIRUB SERPL-MCNC: 1.8 MG/DL (ref 0.1–1.5)
BUN SERPL-MCNC: 15 MG/DL (ref 8–22)
CALCIUM SERPL-MCNC: 9.6 MG/DL (ref 8.5–10.5)
CHLORIDE SERPL-SCNC: 105 MMOL/L (ref 96–112)
CHOLEST SERPL-MCNC: 143 MG/DL (ref 100–199)
CO2 SERPL-SCNC: 26 MMOL/L (ref 20–33)
CREAT SERPL-MCNC: 0.92 MG/DL (ref 0.5–1.4)
EOSINOPHIL # BLD AUTO: 0.39 K/UL (ref 0–0.51)
EOSINOPHIL NFR BLD: 6.1 % (ref 0–6.9)
ERYTHROCYTE [DISTWIDTH] IN BLOOD BY AUTOMATED COUNT: 38.3 FL (ref 35.9–50)
FASTING STATUS PATIENT QL REPORTED: NORMAL
GLOBULIN SER CALC-MCNC: 3.4 G/DL (ref 1.9–3.5)
GLUCOSE SERPL-MCNC: 85 MG/DL (ref 65–99)
HCT VFR BLD AUTO: 48.2 % (ref 42–52)
HDLC SERPL-MCNC: 32 MG/DL
HGB BLD-MCNC: 17.3 G/DL (ref 14–18)
IMM GRANULOCYTES # BLD AUTO: 0.02 K/UL (ref 0–0.11)
IMM GRANULOCYTES NFR BLD AUTO: 0.3 % (ref 0–0.9)
LDLC SERPL CALC-MCNC: 56 MG/DL
LYMPHOCYTES # BLD AUTO: 2.27 K/UL (ref 1–4.8)
LYMPHOCYTES NFR BLD: 35.6 % (ref 22–41)
MCH RBC QN AUTO: 30.8 PG (ref 27–33)
MCHC RBC AUTO-ENTMCNC: 35.9 G/DL (ref 33.7–35.3)
MCV RBC AUTO: 85.8 FL (ref 81.4–97.8)
MONOCYTES # BLD AUTO: 0.57 K/UL (ref 0–0.85)
MONOCYTES NFR BLD AUTO: 8.9 % (ref 0–13.4)
NEUTROPHILS # BLD AUTO: 3.06 K/UL (ref 1.82–7.42)
NEUTROPHILS NFR BLD: 48.2 % (ref 44–72)
NRBC # BLD AUTO: 0 K/UL
NRBC BLD-RTO: 0 /100 WBC
PLATELET # BLD AUTO: 212 K/UL (ref 164–446)
PMV BLD AUTO: 11.3 FL (ref 9–12.9)
POTASSIUM SERPL-SCNC: 4.4 MMOL/L (ref 3.6–5.5)
PROT SERPL-MCNC: 7.9 G/DL (ref 6–8.2)
RBC # BLD AUTO: 5.62 M/UL (ref 4.7–6.1)
SODIUM SERPL-SCNC: 139 MMOL/L (ref 135–145)
TRIGL SERPL-MCNC: 276 MG/DL (ref 0–149)
WBC # BLD AUTO: 6.4 K/UL (ref 4.8–10.8)

## 2019-10-24 PROCEDURE — 84450 TRANSFERASE (AST) (SGOT): CPT

## 2019-10-24 PROCEDURE — 84460 ALANINE AMINO (ALT) (SGPT): CPT

## 2019-10-24 PROCEDURE — 87338 HPYLORI STOOL AG IA: CPT

## 2019-10-24 PROCEDURE — 80061 LIPID PANEL: CPT

## 2019-10-24 PROCEDURE — 82728 ASSAY OF FERRITIN: CPT

## 2019-10-24 PROCEDURE — 85025 COMPLETE CBC W/AUTO DIFF WBC: CPT

## 2019-10-24 PROCEDURE — 82306 VITAMIN D 25 HYDROXY: CPT

## 2019-10-24 PROCEDURE — 82947 ASSAY GLUCOSE BLOOD QUANT: CPT

## 2019-10-24 PROCEDURE — 80053 COMPREHEN METABOLIC PANEL: CPT

## 2019-10-24 PROCEDURE — 36415 COLL VENOUS BLD VENIPUNCTURE: CPT

## 2019-10-25 LAB — H PYLORI AG STL QL IA: NOT DETECTED

## 2019-10-31 LAB
ALT SERPL-CCNC: 43 U/L (ref 5–50)
ANNOTATION COMMENT IMP: NORMAL
AST SERPL-CCNC: 33 U/L (ref 9–50)
FERRITIN SERPL-MCNC: 408 NG/ML (ref 48–420)
FIBROSIS STAGE SERPL QL: NORMAL
GLUCOSE SERPL-MCNC: 88 MG/DL
LIVER FIBR SCORE SERPL CALC.FIBROMETER: 0.05
PATHOLOGY STUDY: NORMAL

## 2019-11-05 ENCOUNTER — APPOINTMENT (OUTPATIENT)
Dept: RADIOLOGY | Facility: MEDICAL CENTER | Age: 39
DRG: 854 | End: 2019-11-05
Attending: EMERGENCY MEDICINE
Payer: COMMERCIAL

## 2019-11-05 ENCOUNTER — ANESTHESIA (OUTPATIENT)
Dept: SURGERY | Facility: MEDICAL CENTER | Age: 39
DRG: 854 | End: 2019-11-05
Payer: COMMERCIAL

## 2019-11-05 ENCOUNTER — ANESTHESIA EVENT (OUTPATIENT)
Dept: SURGERY | Facility: MEDICAL CENTER | Age: 39
DRG: 854 | End: 2019-11-05
Payer: COMMERCIAL

## 2019-11-05 ENCOUNTER — APPOINTMENT (OUTPATIENT)
Dept: RADIOLOGY | Facility: MEDICAL CENTER | Age: 39
DRG: 854 | End: 2019-11-05
Attending: STUDENT IN AN ORGANIZED HEALTH CARE EDUCATION/TRAINING PROGRAM
Payer: COMMERCIAL

## 2019-11-05 ENCOUNTER — HOSPITAL ENCOUNTER (INPATIENT)
Facility: MEDICAL CENTER | Age: 39
LOS: 1 days | DRG: 854 | End: 2019-11-06
Attending: EMERGENCY MEDICINE | Admitting: INTERNAL MEDICINE
Payer: COMMERCIAL

## 2019-11-05 PROBLEM — E87.6 HYPOKALEMIA: Status: ACTIVE | Noted: 2019-11-05

## 2019-11-05 PROBLEM — K81.0 ACUTE CHOLECYSTITIS: Status: ACTIVE | Noted: 2019-11-05

## 2019-11-05 PROBLEM — E78.1 HYPERTRIGLYCERIDEMIA, FAMILIAL: Status: ACTIVE | Noted: 2019-11-05

## 2019-11-05 LAB
ALBUMIN SERPL BCP-MCNC: 4.6 G/DL (ref 3.2–4.9)
ALBUMIN/GLOB SERPL: 1.4 G/DL
ALP SERPL-CCNC: 103 U/L (ref 30–99)
ALT SERPL-CCNC: 43 U/L (ref 2–50)
ANION GAP SERPL CALC-SCNC: 12 MMOL/L (ref 0–11.9)
APPEARANCE UR: CLEAR
AST SERPL-CCNC: 46 U/L (ref 12–45)
BASOPHILS # BLD AUTO: 0.5 % (ref 0–1.8)
BASOPHILS # BLD: 0.08 K/UL (ref 0–0.12)
BILIRUB SERPL-MCNC: 2 MG/DL (ref 0.1–1.5)
BILIRUB UR QL STRIP.AUTO: NEGATIVE
BUN SERPL-MCNC: 19 MG/DL (ref 8–22)
CALCIUM SERPL-MCNC: 9.6 MG/DL (ref 8.5–10.5)
CHLORIDE SERPL-SCNC: 104 MMOL/L (ref 96–112)
CO2 SERPL-SCNC: 23 MMOL/L (ref 20–33)
COLOR UR: ABNORMAL
CREAT SERPL-MCNC: 1.12 MG/DL (ref 0.5–1.4)
EOSINOPHIL # BLD AUTO: 0.36 K/UL (ref 0–0.51)
EOSINOPHIL NFR BLD: 2.2 % (ref 0–6.9)
ERYTHROCYTE [DISTWIDTH] IN BLOOD BY AUTOMATED COUNT: 37.2 FL (ref 35.9–50)
GLOBULIN SER CALC-MCNC: 3.3 G/DL (ref 1.9–3.5)
GLUCOSE SERPL-MCNC: 163 MG/DL (ref 65–99)
GLUCOSE UR STRIP.AUTO-MCNC: NEGATIVE MG/DL
HCT VFR BLD AUTO: 49.9 % (ref 42–52)
HGB BLD-MCNC: 17.9 G/DL (ref 14–18)
IMM GRANULOCYTES # BLD AUTO: 0.06 K/UL (ref 0–0.11)
IMM GRANULOCYTES NFR BLD AUTO: 0.4 % (ref 0–0.9)
KETONES UR STRIP.AUTO-MCNC: ABNORMAL MG/DL
LEUKOCYTE ESTERASE UR QL STRIP.AUTO: NEGATIVE
LIPASE SERPL-CCNC: 41 U/L (ref 11–82)
LYMPHOCYTES # BLD AUTO: 3.27 K/UL (ref 1–4.8)
LYMPHOCYTES NFR BLD: 20.3 % (ref 22–41)
MCH RBC QN AUTO: 30.6 PG (ref 27–33)
MCHC RBC AUTO-ENTMCNC: 35.9 G/DL (ref 33.7–35.3)
MCV RBC AUTO: 85.3 FL (ref 81.4–97.8)
MICRO URNS: ABNORMAL
MONOCYTES # BLD AUTO: 0.77 K/UL (ref 0–0.85)
MONOCYTES NFR BLD AUTO: 4.8 % (ref 0–13.4)
NEUTROPHILS # BLD AUTO: 11.58 K/UL (ref 1.82–7.42)
NEUTROPHILS NFR BLD: 71.8 % (ref 44–72)
NITRITE UR QL STRIP.AUTO: NEGATIVE
NRBC # BLD AUTO: 0 K/UL
NRBC BLD-RTO: 0 /100 WBC
PH UR STRIP.AUTO: 5.5 [PH] (ref 5–8)
PLATELET # BLD AUTO: 277 K/UL (ref 164–446)
PMV BLD AUTO: 10.3 FL (ref 9–12.9)
POTASSIUM SERPL-SCNC: 3.5 MMOL/L (ref 3.6–5.5)
PROT SERPL-MCNC: 7.9 G/DL (ref 6–8.2)
PROT UR QL STRIP: NEGATIVE MG/DL
RBC # BLD AUTO: 5.85 M/UL (ref 4.7–6.1)
RBC UR QL AUTO: NEGATIVE
SODIUM SERPL-SCNC: 139 MMOL/L (ref 135–145)
SP GR UR STRIP.AUTO: >=1.045
UROBILINOGEN UR STRIP.AUTO-MCNC: 1 MG/DL
WBC # BLD AUTO: 16.1 K/UL (ref 4.8–10.8)

## 2019-11-05 PROCEDURE — 700101 HCHG RX REV CODE 250: Performed by: EMERGENCY MEDICINE

## 2019-11-05 PROCEDURE — 160048 HCHG OR STATISTICAL LEVEL 1-5: Performed by: SURGERY

## 2019-11-05 PROCEDURE — 90471 IMMUNIZATION ADMIN: CPT

## 2019-11-05 PROCEDURE — 500868 HCHG NEEDLE, SURGI(VARES): Performed by: SURGERY

## 2019-11-05 PROCEDURE — 96368 THER/DIAG CONCURRENT INF: CPT

## 2019-11-05 PROCEDURE — 501838 HCHG SUTURE GENERAL: Performed by: SURGERY

## 2019-11-05 PROCEDURE — 700111 HCHG RX REV CODE 636 W/ 250 OVERRIDE (IP): Performed by: STUDENT IN AN ORGANIZED HEALTH CARE EDUCATION/TRAINING PROGRAM

## 2019-11-05 PROCEDURE — 83690 ASSAY OF LIPASE: CPT

## 2019-11-05 PROCEDURE — 700101 HCHG RX REV CODE 250: Performed by: ANESTHESIOLOGY

## 2019-11-05 PROCEDURE — 502240 HCHG MISC OR SUPPLY RC 0272: Performed by: SURGERY

## 2019-11-05 PROCEDURE — 700117 HCHG RX CONTRAST REV CODE 255: Performed by: EMERGENCY MEDICINE

## 2019-11-05 PROCEDURE — 501570 HCHG TROCAR, SEPARATOR: Performed by: SURGERY

## 2019-11-05 PROCEDURE — 502571 HCHG PACK, LAP CHOLE: Performed by: SURGERY

## 2019-11-05 PROCEDURE — 160039 HCHG SURGERY MINUTES - EA ADDL 1 MIN LEVEL 3: Performed by: SURGERY

## 2019-11-05 PROCEDURE — 160002 HCHG RECOVERY MINUTES (STAT): Performed by: SURGERY

## 2019-11-05 PROCEDURE — 80053 COMPREHEN METABOLIC PANEL: CPT

## 2019-11-05 PROCEDURE — 0FT44ZZ RESECTION OF GALLBLADDER, PERCUTANEOUS ENDOSCOPIC APPROACH: ICD-10-PCS | Performed by: SURGERY

## 2019-11-05 PROCEDURE — 74177 CT ABD & PELVIS W/CONTRAST: CPT

## 2019-11-05 PROCEDURE — 160035 HCHG PACU - 1ST 60 MINS PHASE I: Performed by: SURGERY

## 2019-11-05 PROCEDURE — 700105 HCHG RX REV CODE 258: Performed by: EMERGENCY MEDICINE

## 2019-11-05 PROCEDURE — 700105 HCHG RX REV CODE 258: Performed by: STUDENT IN AN ORGANIZED HEALTH CARE EDUCATION/TRAINING PROGRAM

## 2019-11-05 PROCEDURE — 700111 HCHG RX REV CODE 636 W/ 250 OVERRIDE (IP): Performed by: EMERGENCY MEDICINE

## 2019-11-05 PROCEDURE — 160028 HCHG SURGERY MINUTES - 1ST 30 MINS LEVEL 3: Performed by: SURGERY

## 2019-11-05 PROCEDURE — 99223 1ST HOSP IP/OBS HIGH 75: CPT | Mod: GC | Performed by: INTERNAL MEDICINE

## 2019-11-05 PROCEDURE — A9270 NON-COVERED ITEM OR SERVICE: HCPCS | Performed by: STUDENT IN AN ORGANIZED HEALTH CARE EDUCATION/TRAINING PROGRAM

## 2019-11-05 PROCEDURE — 81003 URINALYSIS AUTO W/O SCOPE: CPT

## 2019-11-05 PROCEDURE — 700105 HCHG RX REV CODE 258: Performed by: ANESTHESIOLOGY

## 2019-11-05 PROCEDURE — 96375 TX/PRO/DX INJ NEW DRUG ADDON: CPT

## 2019-11-05 PROCEDURE — 700105 HCHG RX REV CODE 258: Performed by: SURGERY

## 2019-11-05 PROCEDURE — 96365 THER/PROPH/DIAG IV INF INIT: CPT

## 2019-11-05 PROCEDURE — 99285 EMERGENCY DEPT VISIT HI MDM: CPT

## 2019-11-05 PROCEDURE — 88304 TISSUE EXAM BY PATHOLOGIST: CPT

## 2019-11-05 PROCEDURE — 160009 HCHG ANES TIME/MIN: Performed by: SURGERY

## 2019-11-05 PROCEDURE — 700111 HCHG RX REV CODE 636 W/ 250 OVERRIDE (IP): Performed by: ANESTHESIOLOGY

## 2019-11-05 PROCEDURE — 501583 HCHG TROCAR, THRD CAN&SEAL 5X100: Performed by: SURGERY

## 2019-11-05 PROCEDURE — 76705 ECHO EXAM OF ABDOMEN: CPT

## 2019-11-05 PROCEDURE — 160036 HCHG PACU - EA ADDL 30 MINS PHASE I: Performed by: SURGERY

## 2019-11-05 PROCEDURE — 501399 HCHG SPECIMAN BAG, ENDO CATC: Performed by: SURGERY

## 2019-11-05 PROCEDURE — 700102 HCHG RX REV CODE 250 W/ 637 OVERRIDE(OP): Performed by: STUDENT IN AN ORGANIZED HEALTH CARE EDUCATION/TRAINING PROGRAM

## 2019-11-05 PROCEDURE — 500002 HCHG ADHESIVE, DERMABOND: Performed by: SURGERY

## 2019-11-05 PROCEDURE — 90686 IIV4 VACC NO PRSV 0.5 ML IM: CPT | Performed by: STUDENT IN AN ORGANIZED HEALTH CARE EDUCATION/TRAINING PROGRAM

## 2019-11-05 PROCEDURE — 74181 MRI ABDOMEN W/O CONTRAST: CPT

## 2019-11-05 PROCEDURE — 770020 HCHG ROOM/CARE - TELE (206)

## 2019-11-05 PROCEDURE — 501582 HCHG TROCAR, THRD BLADED: Performed by: SURGERY

## 2019-11-05 PROCEDURE — 700101 HCHG RX REV CODE 250: Performed by: SURGERY

## 2019-11-05 PROCEDURE — 3E02340 INTRODUCTION OF INFLUENZA VACCINE INTO MUSCLE, PERCUTANEOUS APPROACH: ICD-10-PCS | Performed by: INTERNAL MEDICINE

## 2019-11-05 PROCEDURE — 700101 HCHG RX REV CODE 250: Performed by: STUDENT IN AN ORGANIZED HEALTH CARE EDUCATION/TRAINING PROGRAM

## 2019-11-05 PROCEDURE — 85025 COMPLETE CBC W/AUTO DIFF WBC: CPT

## 2019-11-05 RX ORDER — DIPHENHYDRAMINE HYDROCHLORIDE 50 MG/ML
12.5 INJECTION INTRAMUSCULAR; INTRAVENOUS
Status: DISCONTINUED | OUTPATIENT
Start: 2019-11-05 | End: 2019-11-05 | Stop reason: HOSPADM

## 2019-11-05 RX ORDER — OMEPRAZOLE 20 MG/1
20 CAPSULE, DELAYED RELEASE ORAL 2 TIMES DAILY
Status: DISCONTINUED | OUTPATIENT
Start: 2019-11-05 | End: 2019-11-06 | Stop reason: HOSPADM

## 2019-11-05 RX ORDER — ACETAMINOPHEN 325 MG/1
650 TABLET ORAL EVERY 6 HOURS PRN
Status: DISCONTINUED | OUTPATIENT
Start: 2019-11-05 | End: 2019-11-06 | Stop reason: HOSPADM

## 2019-11-05 RX ORDER — SODIUM CHLORIDE, SODIUM LACTATE, POTASSIUM CHLORIDE, CALCIUM CHLORIDE 600; 310; 30; 20 MG/100ML; MG/100ML; MG/100ML; MG/100ML
INJECTION, SOLUTION INTRAVENOUS CONTINUOUS
Status: DISCONTINUED | OUTPATIENT
Start: 2019-11-05 | End: 2019-11-05 | Stop reason: HOSPADM

## 2019-11-05 RX ORDER — AMOXICILLIN 250 MG
2 CAPSULE ORAL 2 TIMES DAILY
Status: DISCONTINUED | OUTPATIENT
Start: 2019-11-05 | End: 2019-11-06 | Stop reason: HOSPADM

## 2019-11-05 RX ORDER — DIPHENHYDRAMINE HYDROCHLORIDE 50 MG/ML
INJECTION INTRAMUSCULAR; INTRAVENOUS PRN
Status: DISCONTINUED | OUTPATIENT
Start: 2019-11-05 | End: 2019-11-05 | Stop reason: SURG

## 2019-11-05 RX ORDER — SODIUM CHLORIDE 9 MG/ML
INJECTION, SOLUTION INTRAVENOUS CONTINUOUS
Status: DISCONTINUED | OUTPATIENT
Start: 2019-11-05 | End: 2019-11-06 | Stop reason: HOSPADM

## 2019-11-05 RX ORDER — BUPIVACAINE HYDROCHLORIDE AND EPINEPHRINE 5; 5 MG/ML; UG/ML
INJECTION, SOLUTION EPIDURAL; INTRACAUDAL; PERINEURAL
Status: DISCONTINUED | OUTPATIENT
Start: 2019-11-05 | End: 2019-11-05 | Stop reason: HOSPADM

## 2019-11-05 RX ORDER — OXYCODONE HCL 5 MG/5 ML
10 SOLUTION, ORAL ORAL
Status: DISCONTINUED | OUTPATIENT
Start: 2019-11-05 | End: 2019-11-05 | Stop reason: HOSPADM

## 2019-11-05 RX ORDER — POTASSIUM CHLORIDE 20 MEQ/1
40 TABLET, EXTENDED RELEASE ORAL DAILY
Status: DISCONTINUED | OUTPATIENT
Start: 2019-11-06 | End: 2019-11-06

## 2019-11-05 RX ORDER — HYDROMORPHONE HYDROCHLORIDE 1 MG/ML
0.2 INJECTION, SOLUTION INTRAMUSCULAR; INTRAVENOUS; SUBCUTANEOUS
Status: DISCONTINUED | OUTPATIENT
Start: 2019-11-05 | End: 2019-11-05 | Stop reason: HOSPADM

## 2019-11-05 RX ORDER — LABETALOL HYDROCHLORIDE 5 MG/ML
5 INJECTION, SOLUTION INTRAVENOUS
Status: DISCONTINUED | OUTPATIENT
Start: 2019-11-05 | End: 2019-11-05 | Stop reason: HOSPADM

## 2019-11-05 RX ORDER — KETOROLAC TROMETHAMINE 30 MG/ML
30 INJECTION, SOLUTION INTRAMUSCULAR; INTRAVENOUS EVERY 6 HOURS PRN
Status: DISCONTINUED | OUTPATIENT
Start: 2019-11-05 | End: 2019-11-06 | Stop reason: HOSPADM

## 2019-11-05 RX ORDER — MIDAZOLAM HYDROCHLORIDE 1 MG/ML
1 INJECTION INTRAMUSCULAR; INTRAVENOUS
Status: DISCONTINUED | OUTPATIENT
Start: 2019-11-05 | End: 2019-11-05 | Stop reason: HOSPADM

## 2019-11-05 RX ORDER — HYDROMORPHONE HYDROCHLORIDE 1 MG/ML
0.4 INJECTION, SOLUTION INTRAMUSCULAR; INTRAVENOUS; SUBCUTANEOUS
Status: DISCONTINUED | OUTPATIENT
Start: 2019-11-05 | End: 2019-11-05 | Stop reason: HOSPADM

## 2019-11-05 RX ORDER — POLYETHYLENE GLYCOL 3350 17 G/17G
1 POWDER, FOR SOLUTION ORAL
Status: DISCONTINUED | OUTPATIENT
Start: 2019-11-05 | End: 2019-11-06 | Stop reason: HOSPADM

## 2019-11-05 RX ORDER — MEPERIDINE HYDROCHLORIDE 25 MG/ML
12.5 INJECTION INTRAMUSCULAR; INTRAVENOUS; SUBCUTANEOUS
Status: DISCONTINUED | OUTPATIENT
Start: 2019-11-05 | End: 2019-11-05 | Stop reason: HOSPADM

## 2019-11-05 RX ORDER — ONDANSETRON 2 MG/ML
4 INJECTION INTRAMUSCULAR; INTRAVENOUS
Status: DISCONTINUED | OUTPATIENT
Start: 2019-11-05 | End: 2019-11-05 | Stop reason: HOSPADM

## 2019-11-05 RX ORDER — NEOSTIGMINE METHYLSULFATE 1 MG/ML
INJECTION, SOLUTION INTRAVENOUS PRN
Status: DISCONTINUED | OUTPATIENT
Start: 2019-11-05 | End: 2019-11-05 | Stop reason: SURG

## 2019-11-05 RX ORDER — HYDROMORPHONE HYDROCHLORIDE 1 MG/ML
0.1 INJECTION, SOLUTION INTRAMUSCULAR; INTRAVENOUS; SUBCUTANEOUS
Status: DISCONTINUED | OUTPATIENT
Start: 2019-11-05 | End: 2019-11-05 | Stop reason: HOSPADM

## 2019-11-05 RX ORDER — GLYCOPYRROLATE 0.2 MG/ML
INJECTION INTRAMUSCULAR; INTRAVENOUS PRN
Status: DISCONTINUED | OUTPATIENT
Start: 2019-11-05 | End: 2019-11-05 | Stop reason: SURG

## 2019-11-05 RX ORDER — OXYCODONE HCL 5 MG/5 ML
5 SOLUTION, ORAL ORAL
Status: DISCONTINUED | OUTPATIENT
Start: 2019-11-05 | End: 2019-11-05 | Stop reason: HOSPADM

## 2019-11-05 RX ORDER — HALOPERIDOL 5 MG/ML
1 INJECTION INTRAMUSCULAR
Status: DISCONTINUED | OUTPATIENT
Start: 2019-11-05 | End: 2019-11-05 | Stop reason: HOSPADM

## 2019-11-05 RX ORDER — METOPROLOL TARTRATE 1 MG/ML
1 INJECTION, SOLUTION INTRAVENOUS
Status: DISCONTINUED | OUTPATIENT
Start: 2019-11-05 | End: 2019-11-05 | Stop reason: HOSPADM

## 2019-11-05 RX ORDER — SODIUM CHLORIDE, SODIUM LACTATE, POTASSIUM CHLORIDE, CALCIUM CHLORIDE 600; 310; 30; 20 MG/100ML; MG/100ML; MG/100ML; MG/100ML
INJECTION, SOLUTION INTRAVENOUS
Status: COMPLETED | OUTPATIENT
Start: 2019-11-05 | End: 2019-11-05

## 2019-11-05 RX ORDER — ONDANSETRON 2 MG/ML
INJECTION INTRAMUSCULAR; INTRAVENOUS PRN
Status: DISCONTINUED | OUTPATIENT
Start: 2019-11-05 | End: 2019-11-05 | Stop reason: SURG

## 2019-11-05 RX ORDER — ACETAMINOPHEN 500 MG
1000 TABLET ORAL EVERY 4 HOURS PRN
Status: DISCONTINUED | OUTPATIENT
Start: 2019-11-05 | End: 2019-11-05 | Stop reason: HOSPADM

## 2019-11-05 RX ORDER — SODIUM CHLORIDE, SODIUM LACTATE, POTASSIUM CHLORIDE, CALCIUM CHLORIDE 600; 310; 30; 20 MG/100ML; MG/100ML; MG/100ML; MG/100ML
INJECTION, SOLUTION INTRAVENOUS
Status: DISCONTINUED | OUTPATIENT
Start: 2019-11-05 | End: 2019-11-05 | Stop reason: SURG

## 2019-11-05 RX ORDER — OMEPRAZOLE 20 MG/1
20 CAPSULE, DELAYED RELEASE ORAL 2 TIMES DAILY
Refills: 3 | COMMUNITY
Start: 2019-09-17 | End: 2021-04-28 | Stop reason: SDUPTHER

## 2019-11-05 RX ORDER — ONDANSETRON 2 MG/ML
4 INJECTION INTRAMUSCULAR; INTRAVENOUS ONCE
Status: COMPLETED | OUTPATIENT
Start: 2019-11-05 | End: 2019-11-05

## 2019-11-05 RX ORDER — SODIUM CHLORIDE, SODIUM LACTATE, POTASSIUM CHLORIDE, CALCIUM CHLORIDE 600; 310; 30; 20 MG/100ML; MG/100ML; MG/100ML; MG/100ML
1000 INJECTION, SOLUTION INTRAVENOUS ONCE
Status: COMPLETED | OUTPATIENT
Start: 2019-11-05 | End: 2019-11-05

## 2019-11-05 RX ORDER — BISACODYL 10 MG
10 SUPPOSITORY, RECTAL RECTAL
Status: DISCONTINUED | OUTPATIENT
Start: 2019-11-05 | End: 2019-11-06 | Stop reason: HOSPADM

## 2019-11-05 RX ADMIN — OMEPRAZOLE 20 MG: 20 CAPSULE, DELAYED RELEASE ORAL at 10:32

## 2019-11-05 RX ADMIN — ACETAMINOPHEN 650 MG: 325 TABLET, FILM COATED ORAL at 12:06

## 2019-11-05 RX ADMIN — CEFTRIAXONE SODIUM 2 G: 2 INJECTION, POWDER, FOR SOLUTION INTRAMUSCULAR; INTRAVENOUS at 07:23

## 2019-11-05 RX ADMIN — SENNOSIDES AND DOCUSATE SODIUM 2 TABLET: 8.6; 5 TABLET ORAL at 16:13

## 2019-11-05 RX ADMIN — KETOROLAC TROMETHAMINE 60 MG: 30 INJECTION, SOLUTION INTRAMUSCULAR at 19:41

## 2019-11-05 RX ADMIN — METRONIDAZOLE 500 MG: 500 INJECTION, SOLUTION INTRAVENOUS at 08:06

## 2019-11-05 RX ADMIN — OMEPRAZOLE 20 MG: 20 CAPSULE, DELAYED RELEASE ORAL at 16:13

## 2019-11-05 RX ADMIN — ACETAMINOPHEN 650 MG: 325 TABLET, FILM COATED ORAL at 22:04

## 2019-11-05 RX ADMIN — KETOROLAC TROMETHAMINE 30 MG: 30 INJECTION, SOLUTION INTRAMUSCULAR at 08:05

## 2019-11-05 RX ADMIN — NEOSTIGMINE METHYLSULFATE 5 MG: 1 INJECTION INTRAVENOUS at 19:41

## 2019-11-05 RX ADMIN — SODIUM CHLORIDE, POTASSIUM CHLORIDE, SODIUM LACTATE AND CALCIUM CHLORIDE 1000 ML: 600; 310; 30; 20 INJECTION, SOLUTION INTRAVENOUS at 03:21

## 2019-11-05 RX ADMIN — SODIUM CHLORIDE: 9 INJECTION, SOLUTION INTRAVENOUS at 08:00

## 2019-11-05 RX ADMIN — MIDAZOLAM HYDROCHLORIDE 2 MG: 1 INJECTION, SOLUTION INTRAMUSCULAR; INTRAVENOUS at 19:10

## 2019-11-05 RX ADMIN — PROPOFOL 100 MG: 10 INJECTION, EMULSION INTRAVENOUS at 19:19

## 2019-11-05 RX ADMIN — SODIUM CHLORIDE, POTASSIUM CHLORIDE, SODIUM LACTATE AND CALCIUM CHLORIDE: 600; 310; 30; 20 INJECTION, SOLUTION INTRAVENOUS at 19:02

## 2019-11-05 RX ADMIN — ROCURONIUM BROMIDE 40 MG: 10 INJECTION, SOLUTION INTRAVENOUS at 19:10

## 2019-11-05 RX ADMIN — METRONIDAZOLE 500 MG: 500 INJECTION, SOLUTION INTRAVENOUS at 13:27

## 2019-11-05 RX ADMIN — GLYCOPYRROLATE 1 MG: 0.2 INJECTION INTRAMUSCULAR; INTRAVENOUS at 19:41

## 2019-11-05 RX ADMIN — PROPOFOL 200 MG: 10 INJECTION, EMULSION INTRAVENOUS at 19:10

## 2019-11-05 RX ADMIN — INFLUENZA A VIRUS A/BRISBANE/02/2018 IVR-190 (H1N1) ANTIGEN (FORMALDEHYDE INACTIVATED), INFLUENZA A VIRUS A/KANSAS/14/2017 X-327 (H3N2) ANTIGEN (FORMALDEHYDE INACTIVATED), INFLUENZA B VIRUS B/PHUKET/3073/2013 ANTIGEN (FORMALDEHYDE INACTIVATED), AND INFLUENZA B VIRUS B/MARYLAND/15/2016 BX-69A ANTIGEN (FORMALDEHYDE INACTIVATED) 0.5 ML: 15; 15; 15; 15 INJECTION, SUSPENSION INTRAMUSCULAR at 10:33

## 2019-11-05 RX ADMIN — SODIUM CHLORIDE: 9 INJECTION, SOLUTION INTRAVENOUS at 22:06

## 2019-11-05 RX ADMIN — FENTANYL CITRATE 50 MCG: 50 INJECTION, SOLUTION INTRAMUSCULAR; INTRAVENOUS at 03:21

## 2019-11-05 RX ADMIN — PROPOFOL 50 MG: 10 INJECTION, EMULSION INTRAVENOUS at 19:42

## 2019-11-05 RX ADMIN — ONDANSETRON 4 MG: 2 INJECTION INTRAMUSCULAR; INTRAVENOUS at 03:21

## 2019-11-05 RX ADMIN — ONDANSETRON 4 MG: 2 INJECTION INTRAMUSCULAR; INTRAVENOUS at 19:41

## 2019-11-05 RX ADMIN — FENTANYL CITRATE 100 MCG: 50 INJECTION, SOLUTION INTRAMUSCULAR; INTRAVENOUS at 19:10

## 2019-11-05 RX ADMIN — SENNOSIDES AND DOCUSATE SODIUM 2 TABLET: 8.6; 5 TABLET ORAL at 10:34

## 2019-11-05 RX ADMIN — IOHEXOL 100 ML: 350 INJECTION, SOLUTION INTRAVENOUS at 05:11

## 2019-11-05 RX ADMIN — METRONIDAZOLE 500 MG: 500 INJECTION, SOLUTION INTRAVENOUS at 22:04

## 2019-11-05 RX ADMIN — DIPHENHYDRAMINE HYDROCHLORIDE 25 MG: 50 INJECTION, SOLUTION INTRAMUSCULAR; INTRAVENOUS at 19:28

## 2019-11-05 ASSESSMENT — ENCOUNTER SYMPTOMS
BLURRED VISION: 0
COUGH: 0
CONSTIPATION: 0
SENSORY CHANGE: 0
HEARTBURN: 0
HEADACHES: 0
HEADACHES: 1
ABDOMINAL PAIN: 1
WEIGHT LOSS: 0
DIARRHEA: 0
FEVER: 0
VOMITING: 1
LOSS OF CONSCIOUSNESS: 0
TINGLING: 0
PND: 0
NAUSEA: 1
PALPITATIONS: 0
MEMORY LOSS: 0
BLOOD IN STOOL: 0
MYALGIAS: 0
CHILLS: 0
FLANK PAIN: 0
WEAKNESS: 0
SPUTUM PRODUCTION: 0
SHORTNESS OF BREATH: 0
DOUBLE VISION: 0
SORE THROAT: 0
DIZZINESS: 0
ORTHOPNEA: 0
DEPRESSION: 0
WHEEZING: 0
HEMOPTYSIS: 0

## 2019-11-05 ASSESSMENT — LIFESTYLE VARIABLES
CONSUMPTION TOTAL: POSITIVE
TOTAL SCORE: 0
DOES PATIENT WANT TO STOP DRINKING: NO
EVER_SMOKED: NEVER
HAVE PEOPLE ANNOYED YOU BY CRITICIZING YOUR DRINKING: NO
HAVE YOU EVER FELT YOU SHOULD CUT DOWN ON YOUR DRINKING: NO
EVER FELT BAD OR GUILTY ABOUT YOUR DRINKING: NO
AVERAGE NUMBER OF DAYS PER WEEK YOU HAVE A DRINK CONTAINING ALCOHOL: 1
TOTAL SCORE: 0
EVER HAD A DRINK FIRST THING IN THE MORNING TO STEADY YOUR NERVES TO GET RID OF A HANGOVER: NO
TOTAL SCORE: 0
HOW MANY TIMES IN THE PAST YEAR HAVE YOU HAD 5 OR MORE DRINKS IN A DAY: 1
ALCOHOL_USE: NO
ON A TYPICAL DAY WHEN YOU DRINK ALCOHOL HOW MANY DRINKS DO YOU HAVE: 2
EVER_SMOKED: NEVER

## 2019-11-05 ASSESSMENT — COGNITIVE AND FUNCTIONAL STATUS - GENERAL
DAILY ACTIVITIY SCORE: 24
SUGGESTED CMS G CODE MODIFIER DAILY ACTIVITY: CH
SUGGESTED CMS G CODE MODIFIER MOBILITY: CH
MOBILITY SCORE: 24

## 2019-11-05 ASSESSMENT — COPD QUESTIONNAIRES
HAVE YOU SMOKED AT LEAST 100 CIGARETTES IN YOUR ENTIRE LIFE: NO/DON'T KNOW
COPD SCREENING SCORE: 0
DURING THE PAST 4 WEEKS HOW MUCH DID YOU FEEL SHORT OF BREATH: NONE/LITTLE OF THE TIME
DURING THE PAST 4 WEEKS HOW MUCH DID YOU FEEL SHORT OF BREATH: NONE/LITTLE OF THE TIME
DO YOU EVER COUGH UP ANY MUCUS OR PHLEGM?: NO/ONLY WITH OCCASIONAL COLDS OR INFECTIONS
HAVE YOU SMOKED AT LEAST 100 CIGARETTES IN YOUR ENTIRE LIFE: NO/DON'T KNOW
COPD SCREENING SCORE: 0
IN THE PAST 12 MONTHS DO YOU DO LESS THAN YOU USED TO BECAUSE OF YOUR BREATHING PROBLEMS: DISAGREE/UNSURE
DO YOU EVER COUGH UP ANY MUCUS OR PHLEGM?: NO/ONLY WITH OCCASIONAL COLDS OR INFECTIONS

## 2019-11-05 ASSESSMENT — PAIN DESCRIPTION - DESCRIPTORS: DESCRIPTORS: BURNING

## 2019-11-05 ASSESSMENT — PAIN SCALES - GENERAL: PAIN_LEVEL: 0

## 2019-11-05 ASSESSMENT — PATIENT HEALTH QUESTIONNAIRE - PHQ9
SUM OF ALL RESPONSES TO PHQ9 QUESTIONS 1 AND 2: 0
1. LITTLE INTEREST OR PLEASURE IN DOING THINGS: NOT AT ALL
1. LITTLE INTEREST OR PLEASURE IN DOING THINGS: NOT AT ALL
2. FEELING DOWN, DEPRESSED, IRRITABLE, OR HOPELESS: NOT AT ALL
2. FEELING DOWN, DEPRESSED, IRRITABLE, OR HOPELESS: NOT AT ALL
SUM OF ALL RESPONSES TO PHQ9 QUESTIONS 1 AND 2: 0

## 2019-11-05 NOTE — ED TRIAGE NOTES
Semaj Jensen  39 y.o.  male  Chief Complaint   Patient presents with   • Epigastric Pain     x 2 hrs    • Vomiting     Present to triage c/o epigastric pain and vomiting x 2 hrs. Worse for the past hour. Seen here for the same. Takes pepcid as prescription with no relief. Dry heaving in triage.

## 2019-11-05 NOTE — ED NOTES
Report recd and care assumed.  Pt is resting in bed with physician at bedside. Pt had medication given per mar.  Will continue to monitor.

## 2019-11-05 NOTE — ED NOTES
Phone report given and care assumed by RN.  Pt transported to floor on tele.  No acute change in condition since last entry.

## 2019-11-05 NOTE — ED NOTES
O2 1L NC placed on pt secondary to pain and pain medication. Pt is resting comfortably at this time.

## 2019-11-05 NOTE — CARE PLAN
Problem: Pain  Goal: Alleviation of Pain or a reduction in pain to the patient's comfort goal  Outcome: PROGRESSING AS EXPECTED  Intervention: Pain Management--Medications  Note:   Patient reporting pain is 0/10 at this time. Here for epigastric pain and may need gall bladder surgery. Patient educated to report pain early for optimal control.     Problem: Risk for Deep Vein Thrombosis/Venous Thromboembolism  Goal: DVT/VTE Prevention Measures in Place  Outcome: PROGRESSING AS EXPECTED  Intervention: DVT/VTE prophylaxis assessed and initiated by day 1 or 2 of hospital admission. (Both pharmacologic and mechanical considered. If none needed, reason documented by LIP.)  Note:   Admitted today. Gallbladder surgery may be indicated after MRI abdomen. SCDs on. Patient educated.

## 2019-11-05 NOTE — NON-PROVIDER
Internal Medicine Medical Student Admitting History and Physical  Note Author: Laz Day, Student    Name Semaj Jensen     1980   Age/Sex 39 y.o. male   MRN [id number]   Code Status Full Code        Chief Complaint:  Epigastric pan and vomiting for 1 day     HPI:    Mr. Semaj Jensen is a pleasant 39 year old male who presents to the ED due to epigastric pain and vomiting for 1 day. Patient states that this these episodes occurred 1 month ago, though not of this severity. Patient reports of 6-7 episodes of emesis and dry-heaving though reports of no hematemesis. Patient describes the pain as crampy that localizes to his abdomen that radiates to his legs, right shoulder, and back. Patient reports that nothing seems to improve epigastric pain including the pepcid that was prescribed to him by his PCP and states that when he moves the epigastric pain is worsened. Patient reports of mild obstipation. Patient denies diarrhea, hematochezia, constipation (last BM was yesterday), chronic NSAID usage, dramatic weight changes, eczema, allergies, alcohol intake and history of acid reflux. Patient reports of a 1 cm lesion on right arm.     Review of Systems   Constitutional: Negative for chills, fever and weight loss.   Eyes: Negative for blurred vision.   Respiratory: Negative for cough and hemoptysis.    Cardiovascular: Negative for chest pain and palpitations.   Genitourinary: Negative for dysuria and urgency.   Skin: Positive for rash.   Neurological: Positive for headaches.             Past Medical History (chronic problems, known complications, current management)     History reviewed. No pertinent past medical history.  Mild migranes    Past Surgical History:     History reviewed. No pertinent surgical history.        Medication Allergy/Sensitivities:    Current Facility-Administered Medications:   •  metroNIDAZOLE (FLAGYL) IVPB 500 mg, 500 mg, Intravenous, Once, Cristian Espinosa M.D., Last Rate: 100  mL/hr at 11/05/19 0806, 500 mg at 11/05/19 0806  •  senna-docusate (PERICOLACE or SENOKOT S) 8.6-50 MG per tablet 2 Tab, 2 Tab, Oral, BID **AND** polyethylene glycol/lytes (MIRALAX) PACKET 1 Packet, 1 Packet, Oral, QDAY PRN **AND** magnesium hydroxide (MILK OF MAGNESIA) suspension 30 mL, 30 mL, Oral, QDAY PRN **AND** bisacodyl (DULCOLAX) suppository 10 mg, 10 mg, Rectal, QDAY PRN, Ubaldo Car M.D.  •  Respiratory Care per Protocol, , Nebulization, Continuous RT, Ubaldo Car M.D.  •  NS infusion, , Intravenous, Continuous, Ubaldo Car M.D.  •  enoxaparin (LOVENOX) inj 30 mg, 30 mg, Subcutaneous, Q12HRS, Ubaldo Car M.D., Stopped at 11/05/19 0800  •  acetaminophen (TYLENOL) tablet 650 mg, 650 mg, Oral, Q6HRS PRN, Ubaldo Car M.D.  •  [START ON 11/6/2019] cefTRIAXone (ROCEPHIN) 2 g in  mL IVPB, 2 g, Intravenous, Q24HRS, Ubaldo Car M.D.  •  metroNIDAZOLE (FLAGYL) IVPB 500 mg, 500 mg, Intravenous, Q8HRS, Ubaldo Car M.D.  •  ketorolac (TORADOL) injection 30 mg, 30 mg, Intravenous, Q6HRS PRN, Ubaldo Car M.D., 30 mg at 11/05/19 0805    Current Outpatient Medications:   •  omeprazole (PRILOSEC) 20 MG delayed-release capsule, Take 20 mg by mouth 2 Times a Day., Disp: , Rfl: 3        Family History:    Family History   Problem Relation Age of Onset   • Depression Mother    • Hyperlipidemia Father    • Heart Disease Brother    • Cancer Maternal Grandmother    • Heart Disease Maternal Grandmother    • Hyperlipidemia Maternal Grandmother    • Depression Maternal Grandmother    • Hyperlipidemia Maternal Grandfather    • Hyperlipidemia Paternal Grandmother    • Heart Disease Paternal Grandfather    • Hyperlipidemia Paternal Grandfather          Social History:  Smoking: none  Alcohol: occasional drinking - though has limited drinks since onset of abdominal pain 1 month ago   Illictis: marijuana 1-2 times  per year             Physical Exam     Vitals:    11/05/19 0431 11/05/19 0501 11/05/19 0601 11/05/19 0632   BP: 105/65 116/76 115/64 113/72   Pulse: (!) 59 75 (!) 54 65   Resp:       Temp:       TempSrc:       SpO2: 97% 99% 99% 99%   Weight:       Height:         Body mass index is 26.45 kg/m².  /72   Pulse 65   Temp 36.7 °C (98 °F) (Oral)   Resp 18   Ht 1.829 m (6')   Wt 88.5 kg (195 lb)   SpO2 99%   BMI 26.45 kg/m²   O2 therapy: Pulse Oximetry: 99 %    Physical Exam   Constitutional: He is oriented to person, place, and time and well-developed, well-nourished, and in no distress.   HENT:   Head: Normocephalic and atraumatic.   Cardiovascular: Normal rate and regular rhythm. Exam reveals no gallop and no friction rub.   No murmur heard.  Pulmonary/Chest: Effort normal and breath sounds normal. No respiratory distress. He has no wheezes. He has no rales.   Abdominal: Soft. Bowel sounds are normal. He exhibits no distension. There is tenderness. There is no rebound and no guarding.   Musculoskeletal: Normal range of motion.   Neurological: He is alert and oriented to person, place, and time.   Skin: Skin is warm and dry.     Recent Labs     11/05/19 0228   WBC 16.1*   RBC 5.85   HEMOGLOBIN 17.9   HEMATOCRIT 49.9   MCV 85.3   MCH 30.6   RDW 37.2   PLATELETCT 277   MPV 10.3   NEUTSPOLYS 71.80   LYMPHOCYTES 20.30*   MONOCYTES 4.80   EOSINOPHILS 2.20   BASOPHILS 0.50     Recent Labs     11/05/19 0228   SODIUM 139   POTASSIUM 3.5*   CHLORIDE 104   CO2 23   GLUCOSE 163*   BUN 19     US-RUQ   Final Result         1.  Cholelithiasis and gallbladder sludge with gallbladder wall thickening, findings sonographic compatible with cholecystitis.   2.  Echogenic liver, compatible with fatty change versus fibrosis.   3.  Echogenic pancreas, consider pancreatitis as clinically appropriate.   4.  Nonshadowing echogenic foci along the nondependent gallbladder wall, appearance suggests gallbladder polyp         CT-ABDOMEN-PELVIS WITH   Final Result         1.  No acute abnormality.   2.  Cholelithiasis      [id number]    (Results Pending)           Assessment/Plan     Mr. Semaj Jensen is a pleasant 39 year old male who presents to the ED due to epigastric pain and vomiting for 1 day.    #Cholelithiasis  Assessment and plan    Patient reports of chronic issues with epigastric pain that radiates to his back. On abdominal US it revealed Cholelithiasis and gallbladder sludge with gallbladder wall thickening, findings sonographic compatible with cholecystitis. Elevated WBC count supports the elevated WBC found on cbc.   Plan:  -Scheduled pain regimen beginning with 1000mg tylenol and NSAID (with PPI). 5 mg oxycodone prn for epigastric pain.   -HIDA scan or MRCP for pain control   -begin rocephin 2g and flagyl 500mg   -consult surgery only after better images are obtained to understand severity of disease     #hypokalemia   Assessment and plan    CBC revealed patient was slightly hypokalemic.   Plan:   -potassium supplementation     # hyperglycemia  Assessment and plan   - A1c  -monitor blood sugar with morning CMP

## 2019-11-05 NOTE — ED PROVIDER NOTES
ED Provider Note    CHIEF COMPLAINT  Chief Complaint   Patient presents with   • Epigastric Pain     x 2 hrs    • Vomiting       HPI  Semaj Jensen is a 39 y.o. male who presents with epigastric pain starting this evening.  Has been on and off for several weeks since his prior visit here in September.  No clear source of his epigastric pain was found in September though he did have cholelithiasis on ultrasound.  Has seen his primary care physician without clear diagnosis.  He states that the pain has been intermittent since his last visit here to the emergency department.  Typically worse at night when going to bed.  He will oftentimes wake up in the middle the night with pain.  Has had multiple episodes of vomiting today.  He states that the pain is the worst that it has ever been.  No fevers.  No bloody emesis.  No bloody stool.  No diarrhea.  No recent sick contacts or travel.  Denies prior abdominal surgeries in the past.  The patient has been treated with antacid medications to see if this would help his symptoms however he notes no improvement.    REVIEW OF SYSTEMS  See HPI for further details. All other systems are negative.     PAST MEDICAL HISTORY   Denies given past medical history such as diabetes.    SOCIAL HISTORY  Social History     Tobacco Use   • Smoking status: Never Smoker   • Smokeless tobacco: Never Used   Substance and Sexual Activity   • Alcohol use: Yes     Comment: 2-3 drinks every other week   • Drug use: Yes     Types: Inhaled     Comment: marijuana   • Sexual activity: Yes     Partners: Female     Comment: with wife       SURGICAL HISTORY  patient denies any surgical history    CURRENT MEDICATIONS  Home Medications     Reviewed by Armin Rosario R.N. (Registered Nurse) on 11/05/19 at 0204  Med List Status: Partial   Medication Last Dose Status   famotidine (PEPCID) 20 MG Tab  Active                ALLERGIES  No Known Allergies    PHYSICAL EXAM  VITAL SIGNS: /86   Pulse 82    Temp 36.7 °C (98 °F) (Oral)   Resp 18   Ht 1.829 m (6')   Wt 88.5 kg (195 lb)   SpO2 100%   BMI 26.45 kg/m²   Pulse ox interpretation: I interpret this pulse ox as normal.  Constitutional: Alert in no apparent distress.  HENT: No signs of trauma, Bilateral external ears normal, Nose normal.   Eyes: Pupils are equal and reactive, Conjunctiva normal, Non-icteric.   Neck: Normal range of motion, No tenderness, Supple, No stridor.    Cardiovascular: Regular rate and rhythm.   Thorax & Lungs: Normal breath sounds, No respiratory distress, No wheezing, No chest tenderness.   Abdomen: Bowel sounds normal, Soft, epigastric tenderness, No masses, No pulsatile masses. No peritoneal signs.  Skin: Warm, Dry, No erythema, No rash.   Back: No bony tenderness, No CVA tenderness.   Extremities: Intact distal pulses, No edema, No tenderness, No cyanosis  Neurologic: Alert, No focal deficits noted.   Psychiatric: Affect normal, Judgment normal, Mood normal.       DIAGNOSTIC STUDIES / PROCEDURES    LABS  Labs Reviewed   CBC WITH DIFFERENTIAL - Abnormal; Notable for the following components:       Result Value    WBC 16.1 (*)     MCHC 35.9 (*)     Lymphocytes 20.30 (*)     Neutrophils (Absolute) 11.58 (*)     All other components within normal limits   COMP METABOLIC PANEL - Abnormal; Notable for the following components:    Potassium 3.5 (*)     Anion Gap 12.0 (*)     Glucose 163 (*)     AST(SGOT) 46 (*)     Alkaline Phosphatase 103 (*)     Total Bilirubin 2.0 (*)     All other components within normal limits   URINALYSIS,CULTURE IF INDICATED - Abnormal; Notable for the following components:    Specific Gravity >=1.045 (*)     Ketones Trace (*)     All other components within normal limits   LIPASE   ESTIMATED GFR   REFRACTOMETER          RADIOLOGY  US-RUQ   Final Result         1.  Cholelithiasis and gallbladder sludge with gallbladder wall thickening, findings sonographic compatible with cholecystitis.   2.  Echogenic liver,  compatible with fatty change versus fibrosis.   3.  Echogenic pancreas, consider pancreatitis as clinically appropriate.   4.  Nonshadowing echogenic foci along the nondependent gallbladder wall, appearance suggests gallbladder polyp      CT-ABDOMEN-PELVIS WITH   Final Result         1.  No acute abnormality.   2.  Cholelithiasis      [id number]    (Results Pending)         COURSE & MEDICAL DECISION MAKING    Medications   metroNIDAZOLE (FLAGYL) IVPB 500 mg (has no administration in time range)   cefTRIAXone (ROCEPHIN) 2 g in  mL IVPB (2 g Intravenous New Bag 11/5/19 0723)   fentaNYL (SUBLIMAZE) injection 50 mcg (50 mcg Intravenous Given 11/5/19 0321)   ondansetron (ZOFRAN) syringe/vial injection 4 mg (4 mg Intravenous Given 11/5/19 0321)   lactated ringers infusion (BOLUS) (0 mL Intravenous Stopped 11/5/19 0639)   iohexol (OMNIPAQUE) 350 mg/mL (100 mL Intravenous Given 11/5/19 0511)       Pertinent Labs & Imaging studies reviewed. (See chart for details)  39 y.o. male presenting with worsening epigastric pain.  Associated multiple episodes of vomiting.  He was evaluated a few weeks ago for similar symptoms and pain has been persistent yet intermittent and worsening today.  Has had several episodes of vomiting as well.  No diarrhea.  No fevers.  Has epigastric tenderness on physical examination.  No signs of peritonitis.  No fevers or tachycardia upon arrival.  CT examination was ordered for further evaluation.  No obvious signs of pancreatic inflammation.  Lipase is normal as well.  Did have evidence of cholelithiasis and possible biliary dilatation.  Ultrasound was performed showing gallbladder wall thickening and concerns for cholecystitis.  Has slight elevation in LFTs and bilirubin.    Patient was started on prophylactic antibiotics include ceftriaxone and Flagyl.  N.p.o. status was initiated.    Spoke with Dr Nicole from general surgery who recommends admission to the hospital under internal medicine  for further testing including MRCP given liver enzyme abnormalities.    Spoke with the [facility] [place] internal medicine resident service and they are agreeable with the admission as this patient is followed by the [facility] [place] internal medicine service.    HYDRATION: Based on the patient's presentation of Acute Vomiting and Inability to take oral fluids the patient was given IV fluids. IV Hydration was used because oral hydration was not as rapid as required. Upon recheck following hydration, the patient was improved.      /65   Pulse 69   Temp 36.3 °C (97.3 °F) (Temporal)   Resp 18   Ht 1.829 m (6')   Wt 88.5 kg (195 lb)   SpO2 97%   BMI 26.45 kg/m²       FINAL IMPRESSION  Cholecystitis  Elevated liver enzyme  Elevated bilirubin  Epigastric pain  Non-intractable vomiting      Electronically signed by: Cristian Espinosa, 11/5/2019 3:01 AM

## 2019-11-05 NOTE — H&P
Internal Medicine Admitting History and Physical    Note Author: Eugenio Carrero M.D.       Name Semaj Jensen 1980   Age/Sex 39 y.o. male   MRN [id number]   Code Status Full     After 5PM or if no immediate response to page, please call for cross-coverage  Attending/Team: Dr Gaviria / Luis See Patient List for primary contact information  Call [phone] to page    1st Call - Day Intern (R1):   Dr Carrero 2nd Call - Day Sr. Resident (R2/R3):   Dr Hernandez       Chief Complaint:   Epigastric pain, intractable nausea and vomiting  Suspected cholecystitis.    HPI:  38 yo M with past medical history of intermittent epigastric pain came to the ER with intractable nausea and vomiting x7-8 and epigastric pains started yesterday evening. He states nausea, vomiting is not related with food intake, marijuana. He describes epigastric pain as dull, 8/10, intermittent and states he was having the pain for months. He presented to the ER in September with the same symptoms and found cholelithiasis on ultrasound. He was sent home with PPI without help. He states the pain is worse, at night, not increased with eating.  Patient denies any melena or hematochezia, changes in the color of stools, diarrhea or constipation, obstipation.  He is able to pass gas and stool.    In the ER, Vitals were stable, LFT was concerning for extrahepatic picture with elevated alkP. Abdominal CT was positive for gallstones and  RUQ US was positive for cholecystitis, gall bladder polyp. MRCP show cholelithiasis without any biliary obstruction. Surgery was consulted, went surgery 7pm.     Patient denied any heavy alcohol intake, smoking, recreational drugs.      Review of Systems   Constitutional: Negative for chills and fever.   HENT: Negative for hearing loss and sore throat.    Eyes: Negative for blurred vision and double vision.   Respiratory: Negative for cough, sputum production, shortness of breath and wheezing.     Cardiovascular: Negative for chest pain, palpitations, orthopnea, leg swelling and PND.   Gastrointestinal: Positive for abdominal pain, nausea and vomiting. Negative for blood in stool, constipation, diarrhea, heartburn and melena.   Genitourinary: Negative for dysuria, flank pain, frequency, hematuria and urgency.   Musculoskeletal: Negative for joint pain and myalgias.   Skin: Negative for rash.   Neurological: Negative for dizziness, tingling, sensory change, loss of consciousness, weakness and headaches.   Psychiatric/Behavioral: Negative for depression and memory loss.             Past Medical History (Chronic medical problem, known complications and current treatment)    No cardiac, lung, kidney disorders.    Past Surgical History:  History reviewed. No pertinent surgical history.    Current Outpatient Medications:  Home Medications     Reviewed by Laurent Price (Pharmacy Tech) on 11/05/19 at 3094  Med List Status: Complete   Medication Last Dose Status   omeprazole (PRILOSEC) 20 MG delayed-release capsule 11/2/2019 Active                Medication Allergy/Sensitivities:  No Known Allergies      Family History (mandatory)   Family History   Problem Relation Age of Onset   • Depression Mother    • Hyperlipidemia Father    • Heart Disease Brother    • Cancer Maternal Grandmother    • Heart Disease Maternal Grandmother    • Hyperlipidemia Maternal Grandmother    • Depression Maternal Grandmother    • Hyperlipidemia Maternal Grandfather    • Hyperlipidemia Paternal Grandmother    • Heart Disease Paternal Grandfather    • Hyperlipidemia Paternal Grandfather        Social History (mandatory)   Social History     Socioeconomic History   • Marital status:      Spouse name: Not on file   • Number of children: Not on file   • Years of education: Not on file   • Highest education level: Not on file   Occupational History   • Not on file   Social Needs   • Financial resource strain: Not on file   •  Food insecurity:     Worry: Not on file     Inability: Not on file   • Transportation needs:     Medical: Not on file     Non-medical: Not on file   Tobacco Use   • Smoking status: Never Smoker   • Smokeless tobacco: Never Used   Substance and Sexual Activity   • Alcohol use: Yes     Comment: 2-3 drinks every other week   • Drug use: Yes     Types: Inhaled     Comment: marijuana   • Sexual activity: Yes     Partners: Female     Comment: with wife   Lifestyle   • Physical activity:     Days per week: Not on file     Minutes per session: Not on file   • Stress: Not on file   Relationships   • Social connections:     Talks on phone: Not on file     Gets together: Not on file     Attends Cheondoism service: Not on file     Active member of club or organization: Not on file     Attends meetings of clubs or organizations: Not on file     Relationship status: Not on file   • Intimate partner violence:     Fear of current or ex partner: Not on file     Emotionally abused: Not on file     Physically abused: Not on file     Forced sexual activity: Not on file   Other Topics Concern   • Not on file   Social History Narrative   • Not on file     Living situation: Good  PCP : Helen Daugherty M.D.    Physical Exam     Vitals:    11/05/19 1200 11/05/19 1540 11/05/19 1600 11/05/19 1833   BP: 112/65  114/69 113/76   Pulse: 78 69 68 (!) 54   Resp: 16 18 16 17   Temp: 36.3 °C (97.3 °F)  36.9 °C (98.4 °F) 37.1 °C (98.8 °F)   TempSrc: Temporal  Temporal Temporal   SpO2: 96% 97% 98% 96%   Weight:       Height:         Body mass index is 26.45 kg/m².  O2 therapy: Pulse Oximetry: 96 %, O2 (LPM): 0, O2 Delivery: None (Room Air)    Physical Exam   Constitutional: He is oriented to person, place, and time and well-developed, well-nourished, and in no distress. No distress.   HENT:   Head: Normocephalic and atraumatic.   Mouth/Throat: No oropharyngeal exudate.   Eyes: Pupils are equal, round, and reactive to light. Conjunctivae and EOM are  normal. Right eye exhibits no discharge. No scleral icterus.   Neck: Normal range of motion. No JVD present. No tracheal deviation present.   Cardiovascular: Normal rate, regular rhythm, normal heart sounds and intact distal pulses. Exam reveals no gallop and no friction rub.   No murmur heard.  Pulmonary/Chest: Effort normal and breath sounds normal. No stridor. No respiratory distress. He has no wheezes. He has no rales. He exhibits no tenderness.   Abdominal: Soft. Bowel sounds are normal. He exhibits no distension and no mass. There is tenderness (Predominantly on RUQ. No sol sign present.). There is no rebound and no guarding.   Bedside rectal exam was normal, no melena or bleeding. Guaiac test was negative on bedside.   Musculoskeletal:         General: No tenderness, deformity or edema.   Neurological: He is alert and oriented to person, place, and time. No cranial nerve deficit. Gait normal. Coordination normal.   Skin: No rash noted. He is not diaphoretic. No erythema. No pallor.   Psychiatric: Affect normal.   Vitals reviewed.        Data Review       Old Records Request:   Completed  Current Records review/summary: Completed    Lab Data Review:  Recent Results (from the past 24 hour(s))   CBC WITH DIFFERENTIAL    Collection Time: 11/05/19  2:28 AM   Result Value Ref Range    WBC 16.1 (H) 4.8 - 10.8 K/uL    RBC 5.85 4.70 - 6.10 M/uL    Hemoglobin 17.9 14.0 - 18.0 g/dL    Hematocrit 49.9 42.0 - 52.0 %    MCV 85.3 81.4 - 97.8 fL    MCH 30.6 27.0 - 33.0 pg    MCHC 35.9 (H) 33.7 - 35.3 g/dL    RDW 37.2 35.9 - 50.0 fL    Platelet Count 277 164 - 446 K/uL    MPV 10.3 9.0 - 12.9 fL    Neutrophils-Polys 71.80 44.00 - 72.00 %    Lymphocytes 20.30 (L) 22.00 - 41.00 %    Monocytes 4.80 0.00 - 13.40 %    Eosinophils 2.20 0.00 - 6.90 %    Basophils 0.50 0.00 - 1.80 %    Immature Granulocytes 0.40 0.00 - 0.90 %    Nucleated RBC 0.00 /100 WBC    Neutrophils (Absolute) 11.58 (H) 1.82 - 7.42 K/uL    Lymphs (Absolute)  3.27 1.00 - 4.80 K/uL    Monos (Absolute) 0.77 0.00 - 0.85 K/uL    Eos (Absolute) 0.36 0.00 - 0.51 K/uL    Baso (Absolute) 0.08 0.00 - 0.12 K/uL    Immature Granulocytes (abs) 0.06 0.00 - 0.11 K/uL    NRBC (Absolute) 0.00 K/uL   COMP METABOLIC PANEL    Collection Time: 11/05/19  2:28 AM   Result Value Ref Range    Sodium 139 135 - 145 mmol/L    Potassium 3.5 (L) 3.6 - 5.5 mmol/L    Chloride 104 96 - 112 mmol/L    Co2 23 20 - 33 mmol/L    Anion Gap 12.0 (H) 0.0 - 11.9    Glucose 163 (H) 65 - 99 mg/dL    Bun 19 8 - 22 mg/dL    Creatinine 1.12 0.50 - 1.40 mg/dL    Calcium 9.6 8.5 - 10.5 mg/dL    AST(SGOT) 46 (H) 12 - 45 U/L    ALT(SGPT) 43 2 - 50 U/L    Alkaline Phosphatase 103 (H) 30 - 99 U/L    Total Bilirubin 2.0 (H) 0.1 - 1.5 mg/dL    Albumin 4.6 3.2 - 4.9 g/dL    Total Protein 7.9 6.0 - 8.2 g/dL    Globulin 3.3 1.9 - 3.5 g/dL    A-G Ratio 1.4 g/dL   LIPASE    Collection Time: 11/05/19  2:28 AM   Result Value Ref Range    Lipase 41 11 - 82 U/L   ESTIMATED GFR    Collection Time: 11/05/19  2:28 AM   Result Value Ref Range    GFR If African American >60 >60 mL/min/1.73 m 2    GFR If Non African American >60 >60 mL/min/1.73 m 2   URINALYSIS,CULTURE IF INDICATED    Collection Time: 11/05/19  5:56 AM   Result Value Ref Range    Color DK Yellow     Character Clear     Specific Gravity >=1.045 (A) <1.035    Ph 5.5 5.0 - 8.0    Glucose Negative Negative mg/dL    Ketones Trace (A) Negative mg/dL    Protein Negative Negative mg/dL    Bilirubin Negative Negative    Urobilinogen, Urine 1.0 Negative    Nitrite Negative Negative    Leukocyte Esterase Negative Negative    Occult Blood Negative Negative    Micro Urine Req see below        Imaging/Procedures Review:    Independant Imaging Review: Completed  [id number]   Final Result      1.  No biliary obstruction is identified.      2.  Cholelithiasis.      3.  Borderline thyromegaly.      4.  Nonspecific mildly enlarged periportal lymph nodes, likely reactive.      US-RUQ      Final Result         1.  Cholelithiasis and gallbladder sludge with gallbladder wall thickening, findings sonographic compatible with cholecystitis.   2.  Echogenic liver, compatible with fatty change versus fibrosis.   3.  Echogenic pancreas, consider pancreatitis as clinically appropriate.   4.  Nonshadowing echogenic foci along the nondependent gallbladder wall, appearance suggests gallbladder polyp      CT-ABDOMEN-PELVIS WITH   Final Result         1.  No acute abnormality.   2.  Cholelithiasis             EKG:   EKG Independent Review: Completed  QTc:451, HR: 72, Normal Sinus Rhythm, no ST/T changes none.    Records reviewed and summarized in current documentation :  Yes  [employer] teaching service handout given to patient:  Yes         Assessment/Plan     * Acute cholecystitis  Assessment & Plan  H/o intermittent epigastric pain.  Patient presented with epigastric pain / RUQ pain. No obstruction, peritoneal signs, rectal bleeding.  10/24 h.pylori negative. PPI given without improvement.     On admission:  LFT was concerning for extrahepatic picture with elevated alkP. WBC elevated with neutrophylia.  Abdominal CT was positive for gallstones.  RUQ US was positive for cholecystitis, gall bladder polyp.   MRCP show cholelithiasis without any biliary obstruction.     Plan:  -Surgery was consulted, went cholecystectomy 7pm.   -C3 + flagyl started.  -Omeprazole for possible gastritis.      Hypokalemia  Assessment & Plan  Replace prn.  Possible GI loss.    Hypertriglyceridemia, familial  Assessment & Plan  Presented with .  Family history of hypertriglyceridemia.  No hx of MI.    Plan:  -outpatient follow up w/ PCP.      Anticipated Hospital stay:  >2 midnights        Quality Measures  Quality-Core Measures   Reviewed items::  EKG reviewed, Labs reviewed, Medications reviewed and Radiology images reviewed  Medley catheter::  No Medley  DVT prophylaxis pharmacological::  Enoxaparin (Lovenox)    PCP: Helen Daugherty  M.D.

## 2019-11-06 VITALS
WEIGHT: 195 LBS | OXYGEN SATURATION: 95 % | SYSTOLIC BLOOD PRESSURE: 122 MMHG | RESPIRATION RATE: 15 BRPM | DIASTOLIC BLOOD PRESSURE: 72 MMHG | TEMPERATURE: 98.5 F | BODY MASS INDEX: 26.41 KG/M2 | HEART RATE: 60 BPM | HEIGHT: 72 IN

## 2019-11-06 LAB
ALBUMIN SERPL BCP-MCNC: 3.7 G/DL (ref 3.2–4.9)
ALBUMIN/GLOB SERPL: 1.5 G/DL
ALP SERPL-CCNC: 101 U/L (ref 30–99)
ALT SERPL-CCNC: 83 U/L (ref 2–50)
ANION GAP SERPL CALC-SCNC: 7 MMOL/L (ref 0–11.9)
AST SERPL-CCNC: 49 U/L (ref 12–45)
BASOPHILS # BLD AUTO: 0.5 % (ref 0–1.8)
BASOPHILS # BLD: 0.05 K/UL (ref 0–0.12)
BILIRUB SERPL-MCNC: 1.9 MG/DL (ref 0.1–1.5)
BUN SERPL-MCNC: 11 MG/DL (ref 8–22)
CALCIUM SERPL-MCNC: 8.8 MG/DL (ref 8.5–10.5)
CHLORIDE SERPL-SCNC: 107 MMOL/L (ref 96–112)
CO2 SERPL-SCNC: 24 MMOL/L (ref 20–33)
CREAT SERPL-MCNC: 0.9 MG/DL (ref 0.5–1.4)
EOSINOPHIL # BLD AUTO: 0.39 K/UL (ref 0–0.51)
EOSINOPHIL NFR BLD: 3.6 % (ref 0–6.9)
ERYTHROCYTE [DISTWIDTH] IN BLOOD BY AUTOMATED COUNT: 39 FL (ref 35.9–50)
GLOBULIN SER CALC-MCNC: 2.5 G/DL (ref 1.9–3.5)
GLUCOSE SERPL-MCNC: 93 MG/DL (ref 65–99)
HCT VFR BLD AUTO: 42.1 % (ref 42–52)
HGB BLD-MCNC: 14.8 G/DL (ref 14–18)
IMM GRANULOCYTES # BLD AUTO: 0.04 K/UL (ref 0–0.11)
IMM GRANULOCYTES NFR BLD AUTO: 0.4 % (ref 0–0.9)
LYMPHOCYTES # BLD AUTO: 2.21 K/UL (ref 1–4.8)
LYMPHOCYTES NFR BLD: 20.3 % (ref 22–41)
MCH RBC QN AUTO: 30.4 PG (ref 27–33)
MCHC RBC AUTO-ENTMCNC: 35.2 G/DL (ref 33.7–35.3)
MCV RBC AUTO: 86.4 FL (ref 81.4–97.8)
MONOCYTES # BLD AUTO: 0.75 K/UL (ref 0–0.85)
MONOCYTES NFR BLD AUTO: 6.9 % (ref 0–13.4)
NEUTROPHILS # BLD AUTO: 7.43 K/UL (ref 1.82–7.42)
NEUTROPHILS NFR BLD: 68.3 % (ref 44–72)
NRBC # BLD AUTO: 0 K/UL
NRBC BLD-RTO: 0 /100 WBC
PATHOLOGY CONSULT NOTE: NORMAL
PATHOLOGY CONSULT NOTE: NORMAL
PLATELET # BLD AUTO: 221 K/UL (ref 164–446)
PMV BLD AUTO: 10.3 FL (ref 9–12.9)
POTASSIUM SERPL-SCNC: 3.7 MMOL/L (ref 3.6–5.5)
PROT SERPL-MCNC: 6.2 G/DL (ref 6–8.2)
RBC # BLD AUTO: 4.87 M/UL (ref 4.7–6.1)
SODIUM SERPL-SCNC: 138 MMOL/L (ref 135–145)
WBC # BLD AUTO: 10.9 K/UL (ref 4.8–10.8)

## 2019-11-06 PROCEDURE — 700111 HCHG RX REV CODE 636 W/ 250 OVERRIDE (IP): Performed by: STUDENT IN AN ORGANIZED HEALTH CARE EDUCATION/TRAINING PROGRAM

## 2019-11-06 PROCEDURE — 700102 HCHG RX REV CODE 250 W/ 637 OVERRIDE(OP): Performed by: STUDENT IN AN ORGANIZED HEALTH CARE EDUCATION/TRAINING PROGRAM

## 2019-11-06 PROCEDURE — 80053 COMPREHEN METABOLIC PANEL: CPT

## 2019-11-06 PROCEDURE — 85025 COMPLETE CBC W/AUTO DIFF WBC: CPT

## 2019-11-06 PROCEDURE — 700105 HCHG RX REV CODE 258: Performed by: STUDENT IN AN ORGANIZED HEALTH CARE EDUCATION/TRAINING PROGRAM

## 2019-11-06 PROCEDURE — A9270 NON-COVERED ITEM OR SERVICE: HCPCS | Performed by: STUDENT IN AN ORGANIZED HEALTH CARE EDUCATION/TRAINING PROGRAM

## 2019-11-06 PROCEDURE — 36415 COLL VENOUS BLD VENIPUNCTURE: CPT

## 2019-11-06 PROCEDURE — 99239 HOSP IP/OBS DSCHRG MGMT >30: CPT | Mod: GC | Performed by: INTERNAL MEDICINE

## 2019-11-06 PROCEDURE — 700101 HCHG RX REV CODE 250: Performed by: STUDENT IN AN ORGANIZED HEALTH CARE EDUCATION/TRAINING PROGRAM

## 2019-11-06 RX ORDER — POTASSIUM CHLORIDE 20 MEQ/1
20 TABLET, EXTENDED RELEASE ORAL DAILY
Status: DISCONTINUED | OUTPATIENT
Start: 2019-11-06 | End: 2019-11-06

## 2019-11-06 RX ADMIN — OMEPRAZOLE 20 MG: 20 CAPSULE, DELAYED RELEASE ORAL at 05:36

## 2019-11-06 RX ADMIN — METRONIDAZOLE 500 MG: 500 INJECTION, SOLUTION INTRAVENOUS at 04:25

## 2019-11-06 RX ADMIN — METRONIDAZOLE 500 MG: 500 INJECTION, SOLUTION INTRAVENOUS at 13:10

## 2019-11-06 RX ADMIN — CEFTRIAXONE SODIUM 2 G: 2 INJECTION, POWDER, FOR SOLUTION INTRAMUSCULAR; INTRAVENOUS at 05:36

## 2019-11-06 RX ADMIN — POTASSIUM CHLORIDE 40 MEQ: 20 TABLET, EXTENDED RELEASE ORAL at 05:36

## 2019-11-06 RX ADMIN — ACETAMINOPHEN 650 MG: 325 TABLET, FILM COATED ORAL at 11:43

## 2019-11-06 RX ADMIN — ACETAMINOPHEN 650 MG: 325 TABLET, FILM COATED ORAL at 05:36

## 2019-11-06 RX ADMIN — SENNOSIDES AND DOCUSATE SODIUM 2 TABLET: 8.6; 5 TABLET ORAL at 05:36

## 2019-11-06 ASSESSMENT — ENCOUNTER SYMPTOMS
PALPITATIONS: 0
ABDOMINAL PAIN: 1
FEVER: 0
NAUSEA: 0
SPUTUM PRODUCTION: 0
ORTHOPNEA: 0
HEADACHES: 0
LOSS OF CONSCIOUSNESS: 0
CHILLS: 0
SORE THROAT: 0
WHEEZING: 0
WEAKNESS: 0
COUGH: 0
DIZZINESS: 0
MEMORY LOSS: 0
PND: 0
VOMITING: 0
SHORTNESS OF BREATH: 0
BLOOD IN STOOL: 0
CONSTIPATION: 0
DOUBLE VISION: 0
HEARTBURN: 0
SENSORY CHANGE: 0
DIARRHEA: 0
BLURRED VISION: 0
FLANK PAIN: 0
MYALGIAS: 0
TINGLING: 0
DEPRESSION: 0

## 2019-11-06 NOTE — OP REPORT
Post OP Note    PreOp Diagnosis: Acute cholecystitis    PostOp Diagnosis: Same    Procedure(s):  CHOLECYSTECTOMY, LAPAROSCOPIC    Surgeon(s):  Armin Diaz M.D.    Anesthesiologist/Type of Anesthesia:  Anesthesiologist: Murray York M.D./General    Surgical Staff:  Circulator: Taylor Tarango R.N.  Scrub Person: Magy Dickinson    Specimens removed if any:  ID Type Source Tests Collected by Time Destination   A :  Tissue Gallbladder PATHOLOGY SPECIMEN Armin Diaz M.D. 11/5/2019  7:34 PM        Estimated Blood Loss: 10 cc    Findings: Normal-appearing anatomy    Complications: No complications were noted    Indications: Patient is a 39-year-old male admitted early this morning with abdominal pain and elevated liver functions.  He underwent an MRI showing no evidence of a common bile duct stone.  The patient was counseled extensively as to the risk first benefits of surgery and agreed to proceed fully informed.    OPERATIVE REPORT:     The patient was prepped and draped in the standard sterile surgical fashion after induction of general anesthesia.  Appropriate timeout had been performed and antibiotics delivered.  A verres insertion was performed in a periumbilical fashion.  The abdomen was insufflated to 15 mmHg of CO2.  A 5 mm Visiport was applied.  A subxiphoid 12 mm trocar was applied.  2 right upper quadrant 5 mm trochars were applied under direct visualization.    The gallbladder was located in its right upper quadrant position.  It was retracted superiorly and laterally.  The contents of the hepatocystic triangle were dissected clearly free.  Once a critical view of safety was obtained, the cystic duct was triply clipped on the common duct side and singly clipped on the gallbladder side.  This was then transected.  The cystic artery was dealt with in a similar fashion.  The thunder beat and electrocautery were used to transect the attachments of the gallbladder to the liver bed.  An Endo Catch was  used to retrieve the gallbladder via the subxiphoid incision.    The right upper quadrant was copiously irrigated till clear.  The clips were in place without biliary spillage.  Hemostasis was achieved.  An Endo Close was used to close the subxiphoid incision.  Monocryl was used for skin edges.  Dermabond was applied as a dressing, the patient was extubated to recovery in satisfactory condition.    Disposition:  To the pacu for recovery.          11/5/2019 7:42 PM Armin Diaz M.D.

## 2019-11-06 NOTE — PROGRESS NOTES
Internal Medicine Interval Note  Note Author: Eugenio Carrero M.D.     Name Semaj Jensen 1980   Age/Sex 39 y.o. male   MRN [id number]   Code Status Full     After 5PM or if no immediate response to page, please call for cross-coverage  Attending/Team: Dr Gaviria / Luis See Patient List for primary contact information  Call [phone] to page    1st Call - Day Intern (R1):   Dr Carrero 2nd Call - Day Sr. Resident (R2/R3):   Dr Hernandez         Reason for interval visit  (Principal Problem)   Cholecystitis      Interval Problem Daily Status Update  (24 hours, problem oriented, brief subjective history, new lab/imaging data pertinent to that problem)     - Overnight events: Cholecystectomy  - Subjective: Mild abdominal pain on wound sites  - Vitals: Completely stable.  - Pertinent physical: Abdomen is soft. Tolerated food. No signs of complications.  - Labs/Cultures: LFT elevated (normal after surgery), total bilirubin is 1.9. WBC is down to 10.9.  - Imaging: No imaging today  - Consults: Surgery  - Interventions: LAP Cholecystectomy.  - Med changes: None  - Daytime events 1.3 second pause, no symptoms, no repeat.  - Discharge plan: Discharge home in stable condition.     =====================================     Review of Systems   Constitutional: Negative for chills and fever.   HENT: Negative for hearing loss and sore throat.    Eyes: Negative for blurred vision and double vision.   Respiratory: Negative for cough, sputum production, shortness of breath and wheezing.    Cardiovascular: Negative for chest pain, palpitations, orthopnea, leg swelling and PND.   Gastrointestinal: Positive for abdominal pain. Negative for blood in stool, constipation, diarrhea, heartburn, melena, nausea and vomiting.   Genitourinary: Negative for dysuria, flank pain, frequency, hematuria and urgency.   Musculoskeletal: Negative for joint pain and myalgias.   Skin: Negative for rash.   Neurological: Negative for  dizziness, tingling, sensory change, loss of consciousness, weakness and headaches.   Psychiatric/Behavioral: Negative for depression and memory loss.       Disposition/Barriers to discharge:   None    Consultants/Specialty  Surgery  PCP: Helen Daugherty M.D.      Quality Measures  Quality-Core Measures   Reviewed items::  EKG reviewed, Medications reviewed, Radiology images reviewed and Labs reviewed  Medley catheter::  No Medley          Physical Exam       Vitals:    11/05/19 2354 11/06/19 0400 11/06/19 0850 11/06/19 1200   BP: 121/70 101/60 112/70 122/72   Pulse: 70 61 61 60   Resp: 16 16 17 15   Temp: 36.9 °C (98.5 °F) 37.1 °C (98.7 °F) 36.5 °C (97.7 °F) 36.9 °C (98.5 °F)   TempSrc: Temporal Temporal Temporal Temporal   SpO2: 97% 97% 98% 95%   Weight:       Height:         Body mass index is 26.45 kg/m².    Oxygen Therapy:  Pulse Oximetry: 95 %, O2 (LPM): 0, O2 Delivery: None (Room Air)    Physical Exam   Constitutional: He is oriented to person, place, and time and well-developed, well-nourished, and in no distress. No distress.   HENT:   Head: Normocephalic and atraumatic.   Mouth/Throat: No oropharyngeal exudate.   Eyes: Pupils are equal, round, and reactive to light. Conjunctivae and EOM are normal. Right eye exhibits no discharge. No scleral icterus.   Neck: Normal range of motion. No JVD present. No tracheal deviation present.   Cardiovascular: Normal rate, regular rhythm, normal heart sounds and intact distal pulses. Exam reveals no gallop and no friction rub.   No murmur heard.  Pulmonary/Chest: Effort normal and breath sounds normal. No stridor. No respiratory distress. He has no wheezes. He has no rales. He exhibits no tenderness.   Abdominal: He exhibits no distension and no mass. There is tenderness. There is no rebound and no guarding.   Musculoskeletal:         General: No tenderness, deformity or edema.   Neurological: He is alert and oriented to person, place, and time. No cranial nerve  deficit. Gait normal. Coordination normal.   Skin: No rash noted. He is not diaphoretic. No erythema. No pallor.   Psychiatric: Affect normal.   Vitals reviewed.            Assessment/Plan     * Acute cholecystitis  Assessment & Plan  H/o intermittent epigastric pain.  Patient presented with epigastric pain / RUQ pain. No obstruction, peritoneal signs, rectal bleeding.  10/24 h.pylori negative. PPI given without improvement.     On admission:  LFT was concerning for extrahepatic picture with elevated alkP. WBC elevated with neutrophylia.  Abdominal CT was positive for gallstones.  RUQ US was positive for cholecystitis, gall bladder polyp.   MRCP show cholelithiasis without any biliary obstruction.     Plan:  -Surgery was consulted, went cholecystectomy 7pm.   -C3 + flagyl started.  -Omeprazole for possible gastritis.      Hypokalemia  Assessment & Plan  Replace prn.  Possible GI loss.    Hypertriglyceridemia, familial  Assessment & Plan  Presented with .  Family history of hypertriglyceridemia.  No hx of MI.    Plan:  -outpatient follow up w/ PCP.

## 2019-11-06 NOTE — ASSESSMENT & PLAN NOTE
Presented with .  Family history of hypertriglyceridemia.  No hx of MI.    Plan:  -outpatient follow up w/ PCP.

## 2019-11-06 NOTE — CONSULTS
General Surgery Consult    CHIEF COMPLAINT: Abdominal pain.     HISTORY OF PRESENT ILLNESS: The patient is a 39 y.o. male, who presents with abdominal pain.  He has been experiencing approximately 2 months of midepigastric abdominal pain radiating to the back and associated with nausea.  He originally was seen by his primary care provider who attributed this to reflux.  His pain became so severe he presented to the emergency department early this morning.  Here he underwent a work-up concerning for cholecystitis as well as a common bile duct stone.  Follow-up MRCP was negative.  General surgery was consulted for consideration for cholecystectomy.    PAST MEDICAL HISTORY:   Reflux    PAST SURGICAL HISTORY: patient denies any surgical history     ALLERGIES: No Known Allergies     CURRENT MEDICATIONS:   Home Medications     Reviewed by Laurent Price (Pharmacy Tech) on 11/05/19 at 0754  Med List Status: Complete   Medication Last Dose Status   omeprazole (PRILOSEC) 20 MG delayed-release capsule 11/2/2019 Active                FAMILY HISTORY:   Family History   Problem Relation Age of Onset   • Depression Mother    • Hyperlipidemia Father    • Heart Disease Brother    • Cancer Maternal Grandmother    • Heart Disease Maternal Grandmother    • Hyperlipidemia Maternal Grandmother    • Depression Maternal Grandmother    • Hyperlipidemia Maternal Grandfather    • Hyperlipidemia Paternal Grandmother    • Heart Disease Paternal Grandfather    • Hyperlipidemia Paternal Grandfather         SOCIAL HISTORY:   Social History     Tobacco Use   • Smoking status: Never Smoker   • Smokeless tobacco: Never Used   Substance and Sexual Activity   • Alcohol use: Yes     Comment: 2-3 drinks every other week   • Drug use: Yes     Types: Inhaled     Comment: marijuana   • Sexual activity: Yes     Partners: Female     Comment: with wife       REVIEW OF SYSTEMS: Comprehensive review of systems was negative aside from abdominal pain  described in the above HPI    PHYSICAL EXAMINATION:     GENERAL: The patient is in no acute distress.   VITAL SIGNS: /76   Pulse (!) 54   Temp 37.1 °C (98.8 °F) (Temporal)   Resp 17   Ht 1.829 m (6')   Wt 88.5 kg (195 lb)   SpO2 96%   HEAD AND NECK: Demonstrates symmetric, reactive pupils. Extraocular muscles   are intact. Nares and oropharynx are clear.   NECK: Supple. No adenopathy.  CHEST:No respiratory distress.    CARDIOVASCULAR: Regular rate. The extremities are well perfused.   ABDOMEN: Thin abdomen.  Mildly tender to deep palpation in the midepigastrium.   EXTREMITIES: Examination of the upper and lower extremities demonstrates no cyanosis edema or clubbing.  NEUROLOGIC: Alert & oriented x 3, Normal motor function, Normal sensory function, No focal deficits noted.    LABORATORY VALUES:   Recent Labs     11/05/19 0228   WBC 16.1*   RBC 5.85   HEMOGLOBIN 17.9   HEMATOCRIT 49.9   MCV 85.3   MCH 30.6   MCHC 35.9*   RDW 37.2   PLATELETCT 277   MPV 10.3     Recent Labs     11/05/19 0228   SODIUM 139   POTASSIUM 3.5*   CHLORIDE 104   CO2 23   GLUCOSE 163*   BUN 19   CREATININE 1.12   CALCIUM 9.6     Recent Labs     11/05/19 0228   ASTSGOT 46*   ALTSGPT 43   TBILIRUBIN 2.0*   ALKPHOSPHAT 103*   GLOBULIN 3.3            IMAGING:   [id number]   Final Result      1.  No biliary obstruction is identified.      2.  Cholelithiasis.      3.  Borderline thyromegaly.      4.  Nonspecific mildly enlarged periportal lymph nodes, likely reactive.      US-RUQ   Final Result         1.  Cholelithiasis and gallbladder sludge with gallbladder wall thickening, findings sonographic compatible with cholecystitis.   2.  Echogenic liver, compatible with fatty change versus fibrosis.   3.  Echogenic pancreas, consider pancreatitis as clinically appropriate.   4.  Nonshadowing echogenic foci along the nondependent gallbladder wall, appearance suggests gallbladder polyp      CT-ABDOMEN-PELVIS WITH   Final Result          1.  No acute abnormality.   2.  Cholelithiasis          IMPRESSION AND PLAN:     1.  Acute cholecystitis.    1.  The patient will be taken to the operating room for a laparoscopic cholecystectomy. The surgical conduct was explained. Potential complications including but not limited to infection, bleeding, damage to adjacent structures, anesthetic complications were discussed in detail. Questions were elicited and answered to his satisfaction. He understands the rationale for surgery and elects to proceed.  Operative consent signed.          ___________________________________   Armin Diaz M.D.    DD: 11/5/2019 DT: 6:39 PM

## 2019-11-06 NOTE — PROGRESS NOTES
Monitor Summary: SR 65-76, NE .16, QRS .08, QT .34 with heart rate as low as 45 and a 1.4 second pause per strip from monitor room.

## 2019-11-06 NOTE — ANESTHESIA POSTPROCEDURE EVALUATION
Patient: Semaj Jensen    Procedure Summary     Date:  11/05/19 Room / Location:  [facility] 11 / SURGERY [facility]    Anesthesia Start:  1902 Anesthesia Stop:  1955    Procedure:  CHOLECYSTECTOMY, LAPAROSCOPIC (Abdomen) Diagnosis:  (Acute cholecystitis)    Surgeon:  Armin Diaz M.D. Responsible Provider:  Murray York M.D.    Anesthesia Type:  general ASA Status:  1 - Emergent          Final Anesthesia Type: general  Last vitals  BP   Blood Pressure: 113/76    Temp   37.1 °C (98.8 °F)    Pulse   Pulse: (!) 54   Resp   17    SpO2   96 %      Anesthesia Post Evaluation    Patient location during evaluation: PACU  Patient participation: complete - patient participated  Level of consciousness: awake and alert  Pain score: 0    Airway patency: patent  Anesthetic complications: no  Cardiovascular status: hemodynamically stable  Respiratory status: acceptable  Hydration status: euvolemic    PONV: none           Nurse Pain Score: 1 (NPRS)

## 2019-11-06 NOTE — ASSESSMENT & PLAN NOTE
H/o intermittent epigastric pain.  Patient presented with epigastric pain / RUQ pain. No obstruction, peritoneal signs, rectal bleeding.  10/24 h.pylori negative. PPI given without improvement.     On admission:  LFT was concerning for extrahepatic picture with elevated alkP. WBC elevated with neutrophylia.  Abdominal CT was positive for gallstones.  RUQ US was positive for cholecystitis, gall bladder polyp.   MRCP show cholelithiasis without any biliary obstruction.     Plan:  -Surgery was consulted, went cholecystectomy 7pm.   -C3 + flagyl started.  -Omeprazole for possible gastritis.

## 2019-11-06 NOTE — NON-PROVIDER
Internal Medicine Medical Student Note  Note Author: Laz Day, Student    Name Semaj Jensen     1980   Age/Sex 39 y.o. male   MRN [id number]   Code Status Full code             Reason for interval visit  (Principal Problem)   Acute cholecystitis    Interval Problem Daily Status Update  (problem status, last 24 hours, new history, new data )     Mr. Semaj Jensen is a 39 year old male hospital day 2 admitted for constant abdominal pain that radiates to his back and legs for one month. Patient underwent a laparoscopic cholecystectomy yesterday evening for a diagnosis of acute cholecystitis caused by gallbladder sludge causing gallbladder wall thickening. Patient's once elevated white blood count has now down-trended since the usage of flagyl and rocephin. Patient states that he no longer is nauseous and have urges to vomit. Patient states that his abdomen is mildly sore, though he believes the discomfort is different from his previous abdominal pain- likely from surgery. Patient denies dyschezia, dyspnea, dysuria, and hematuria.       Physical Exam       Vitals:    19 2130 19 2148 19 2354 19 0400   BP: 107/68 120/75 121/70 101/60   Pulse: 72 69 70 61   Resp: 16 16 16 16   Temp:  36.8 °C (98.2 °F) 36.9 °C (98.5 °F) 37.1 °C (98.7 °F)   TempSrc:  Temporal Temporal Temporal   SpO2: 91% 94% 97% 97%   Weight:       Height:         Body mass index is 26.45 kg/m².    Oxygen Therapy:  Pulse Oximetry: 97 %, O2 (LPM): 0, O2 Delivery: None (Room Air)    Physical Exam   Constitutional: He is oriented to person, place, and time and well-developed, well-nourished, and in no distress.   HENT:   Head: Normocephalic and atraumatic.   Neck: Normal range of motion. No tracheal deviation present.   Cardiovascular: Normal rate and regular rhythm.   Pulmonary/Chest: Effort normal and breath sounds normal. No respiratory distress. He has no wheezes. He has no rales.   Abdominal: Soft. Bowel  sounds are normal. He exhibits distension. There is no tenderness. There is no rebound and no guarding.   Musculoskeletal: Normal range of motion.   Neurological: He is alert and oriented to person, place, and time.   Skin: Skin is warm and dry.     Recent Labs     11/05/19 0228 11/06/19  0308   WBC 16.1* 10.9*   RBC 5.85 4.87   HEMOGLOBIN 17.9 14.8   HEMATOCRIT 49.9 42.1   MCV 85.3 86.4   MCH 30.6 30.4   RDW 37.2 39.0   PLATELETCT 277 221   MPV 10.3 10.3   NEUTSPOLYS 71.80 68.30   LYMPHOCYTES 20.30* 20.30*   MONOCYTES 4.80 6.90   EOSINOPHILS 2.20 3.60   BASOPHILS 0.50 0.50     Recent Labs     11/05/19 0228 11/06/19  0308   SODIUM 139 138   POTASSIUM 3.5* 3.7   CHLORIDE 104 107   CO2 23 24   GLUCOSE 163* 93   BUN 19 11     [id number]-X/O   Final Result      1.  No biliary obstruction is identified.      2.  Cholelithiasis.      3.  Borderline thyromegaly.      4.  Nonspecific mildly enlarged periportal lymph nodes, likely reactive.      US-RUQ   Final Result         1.  Cholelithiasis and gallbladder sludge with gallbladder wall thickening, findings sonographic compatible with cholecystitis.   2.  Echogenic liver, compatible with fatty change versus fibrosis.   3.  Echogenic pancreas, consider pancreatitis as clinically appropriate.   4.  Nonshadowing echogenic foci along the nondependent gallbladder wall, appearance suggests gallbladder polyp      CT-ABDOMEN-PELVIS WITH   Final Result         1.  No acute abnormality.   2.  Cholelithiasis                Assessment/Plan       Mr. Semaj Jensen is a pleasant 39 year old male who presents to the ED due to epigastric pain and vomiting for 1 day.     #Cholelithiasis  Assessment and plan               Patient reports of chronic issues with epigastric pain that radiates to his back. On abdominal US it revealed Cholelithiasis and gallbladder sludge with gallbladder wall thickening, findings sonographic compatible with cholecystitis. Elevated WBC count supports the  elevated WBC found on cbc. Patient's white blood count this morning was 10.9 which has down-trended from 16.1 from yesterday- patient has been taking flagyl and rocephin.   Plan:  -D/C pt as soon as surgery clears patient   -D/C pt with PO ibuprofen 1000mg and limited supply of 5mg hydrocodone.   -discontinue rocephin 2g and flagyl 500mg        #hypokalemia   Assessment and plan               CBC revealed patient was slightly hypokalemic.   Plan:    -potassium supplementation      # hyperglycemia  Assessment and plan   - A1c  -monitor blood sugar with morning CMP

## 2019-11-06 NOTE — ANESTHESIA QCDR
2019 [facility] Clinical Data Registry (for Quality Improvement)     Postoperative nausea/vomiting risk protocol (Adult = 18 yrs and Pediatric 3-17 yrs)- (430 and 463)  General inhalation anesthetic (NOT TIVA) with PONV risk factors: Yes  Provision of anti-emetic therapy with at least 2 different classes of agents: Yes   Patient DID NOT receive anti-emetic therapy and reason is documented in Medical Record:  N/A    Multimodal Pain Management- (AQI59)  Patient undergoing Elective Surgery (i.e. Outpatient, or ASC, or Prescheduled Surgery prior to Hospital Admission): No  Use of Multimodal Pain Management, two or more drugs and/or interventions, NOT including systemic opioids: N/A  Exception: Documented allergy to multiple classes of analgesics: N/A    PACU assessment of acute postoperative pain prior to Anesthesia Care End- Applies to Patients Age = 18- (ABG7)  Initial PACU pain score is which of the following: < 7/10  Patient unable to report pain score: N/A    Post-anesthetic transfer of care checklist/protocol to PACU/ICU- (426 and 427)  Upon conclusion of case, patient transferred to which of the following locations: PACU/Non-ICU  Use of transfer checklist/protocol: Yes  Exclusion: Service Performed in Patient Hospital Room (and thus did not require transfer): N/A    PACU Reintubation- (AQI31)  General anesthesia requiring endotracheal intubation (ETT) along with subsequent extubation in OR or PACU: Yes  Required reintubation in the PACU: No   Extubation was a planned trial documented in the medical record prior to removal of the original airway device:  N/A    Unplanned admission to ICU related to anesthesia service up through end of PACU care- (MD51)  Unplanned admission to ICU (not initially anticipated at anesthesia start time): No

## 2019-11-06 NOTE — ANESTHESIA PREPROCEDURE EVALUATION
Relevant Problems   No relevant active problems       Physical Exam    Airway   Mallampati: II  TM distance: >3 FB  Neck ROM: full       Cardiovascular - normal exam  Rhythm: regular  Rate: normal  (-) murmur     Dental - normal exam         Pulmonary - normal exam  Breath sounds clear to auscultation     Abdominal    Neurological - normal exam                 Anesthesia Plan    ASA 1- EMERGENT       Plan - general       Airway plan will be ETT                  Informed Consent:    Anesthetic plan and risks discussed with patient.      acute abdomen

## 2019-11-06 NOTE — DISCHARGE INSTRUCTIONS
Discharge Instructions    Discharged to home by car with relative. Discharged via wheelchair, hospital escort: Yes.  Special equipment needed: Not Applicable    Be sure to schedule a follow-up appointment with your primary care doctor or any specialists as instructed.     Discharge Plan:   Diet Plan: Discussed  Activity Level: Discussed  Confirmed Follow up Appointment: Appointment Scheduled  Confirmed Symptoms Management: Discussed  Medication Reconciliation Updated: Yes  Influenza Vaccine Indication: Indicated: 9 to 64 years of age  Influenza Vaccine Given - only chart on this line when given: Influenza Vaccine Given (See MAR)    I understand that a diet low in cholesterol, fat, and sodium is recommended for good health. Unless I have been given specific instructions below for another diet, I accept this instruction as my diet prescription.       Special Instructions: None    · Is patient discharged on Warfarin / Coumadin?   No     Depression / Suicide Risk    As you are discharged from this [facility] facility, it is important to learn how to keep safe from harming yourself.    Recognize the warning signs:  · Abrupt changes in personality, positive or negative- including increase in energy   · Giving away possessions  · Change in eating patterns- significant weight changes-  positive or negative  · Change in sleeping patterns- unable to sleep or sleeping all the time   · Unwillingness or inability to communicate  · Depression  · Unusual sadness, discouragement and loneliness  · Talk of wanting to die  · Neglect of personal appearance   · Rebelliousness- reckless behavior  · Withdrawal from people/activities they love  · Confusion- inability to concentrate     If you or a loved one observes any of these behaviors or has concerns about self-harm, here's what you can do:  · Talk about it- your feelings and reasons for harming yourself  · Remove any means that you might use to hurt yourself (examples: pills, rope,  extension cords, firearm)  · Get professional help from the community (Mental Health, Substance Abuse, psychological counseling)  · Do not be alone:Call your Safe Contact- someone whom you trust who will be there for you.  · Call your local CRISIS HOTLINE 102-4574 or 136-348-0627  · Call your local Children's Mobile Crisis Response Team [place] (730) 118-4463 or www.Motosmarty  · Call the toll free National Suicide Prevention Hotlines   · National Suicide Prevention Lifeline 605-761-AJYE (7246)  · [employer] Line Network 800-SUICIDE (593-9396)      Laparoscopic Cholecystectomy  Laparoscopic cholecystectomy is surgery to remove the gallbladder. The gallbladder is a pear-shaped organ that lies beneath the liver on the right side of the body. The gallbladder stores bile, which is a fluid that helps the body to digest fats. Cholecystectomy is often done for inflammation of the gallbladder (cholecystitis). This condition is usually caused by a buildup of gallstones (cholelithiasis) in the gallbladder. Gallstones can block the flow of bile, which can result in inflammation and pain. In severe cases, emergency surgery may be required.  This procedure is done though small incisions in your abdomen (laparoscopic surgery). A thin scope with a camera (laparoscope) is inserted through one incision. Thin surgical instruments are inserted through the other incisions. In some cases, a laparoscopic procedure may be turned into a type of surgery that is done through a larger incision (open surgery).  Tell a health care provider about:  · Any allergies you have.  · All medicines you are taking, including vitamins, herbs, eye drops, creams, and over-the-counter medicines.  · Any problems you or family members have had with anesthetic medicines.  · Any blood disorders you have.  · Any surgeries you have had.  · Any medical conditions you have.  · Whether you are pregnant or may be pregnant.  What are the  risks?  Generally, this is a safe procedure. However, problems may occur, including:  · Infection.  · Bleeding.  · Allergic reactions to medicines.  · Damage to other structures or organs.  · A stone remaining in the common bile duct. The common bile duct carries bile from the gallbladder into the small intestine.  · A bile leak from the cyst duct that is clipped when your gallbladder is removed.  What happens before the procedure?  Staying hydrated   Follow instructions from your health care provider about hydration, which may include:  · Up to 2 hours before the procedure - you may continue to drink clear liquids, such as water, clear fruit juice, black coffee, and plain tea.  Eating and drinking restrictions   Follow instructions from your health care provider about eating and drinking, which may include:  · 8 hours before the procedure - stop eating heavy meals or foods such as meat, fried foods, or fatty foods.  · 6 hours before the procedure - stop eating light meals or foods, such as toast or cereal.  · 6 hours before the procedure - stop drinking milk or drinks that contain milk.  · 2 hours before the procedure - stop drinking clear liquids.  Medicines  · Ask your health care provider about:  ¨ Changing or stopping your regular medicines. This is especially important if you are taking diabetes medicines or blood thinners.  ¨ Taking medicines such as aspirin and ibuprofen. These medicines can thin your blood. Do not take these medicines before your procedure if your health care provider instructs you not to.  · You may be given antibiotic medicine to help prevent infection.  General instructions  · Let your health care provider know if you develop a cold or an infection before surgery.  · Plan to have someone take you home from the hospital or clinic.  · Ask your health care provider how your surgical site will be marked or identified.  What happens during the procedure?  · To reduce your risk of  infection:  ¨ Your health care team will wash or sanitize their hands.  ¨ Your skin will be washed with soap.  ¨ Hair may be removed from the surgical area.  · An IV tube may be inserted into one of your veins.  · You will be given one or more of the following:  ¨ A medicine to help you relax (sedative).  ¨ A medicine to make you fall asleep (general anesthetic).  · A breathing tube will be placed in your mouth.  · Your surgeon will make several small cuts (incisions) in your abdomen.  · The laparoscope will be inserted through one of the small incisions. The camera on the laparoscope will send images to a TV screen (monitor) in the operating room. This lets your surgeon see inside your abdomen.  · Air-like gas will be pumped into your abdomen. This will expand your abdomen to give the surgeon more room to perform the surgery.  · Other tools that are needed for the procedure will be inserted through the other incisions. The gallbladder will be removed through one of the incisions.  · Your common bile duct may be examined. If stones are found in the common bile duct, they may be removed.  · After your gallbladder has been removed, the incisions will be closed with stitches (sutures), staples, or skin glue.  · Your incisions may be covered with a bandage (dressing).  The procedure may vary among health care providers and hospitals.  What happens after the procedure?  · Your blood pressure, heart rate, breathing rate, and blood oxygen level will be monitored until the medicines you were given have worn off.  · You will be given medicines as needed to control your pain.  · Do not drive for 24 hours if you were given a sedative.  This information is not intended to replace advice given to you by your health care provider. Make sure you discuss any questions you have with your health care provider.  Document Released: 12/18/2006 Document Revised: 07/09/2017 Document Reviewed: 06/05/2017  [employer] Patient  Education © 2017 [employer]

## 2019-11-06 NOTE — ANESTHESIA TIME REPORT
Anesthesia Start and Stop Event Times     Date Time Event    11/5/2019 1857 Ready for Procedure     1902 Anesthesia Start        Responsible Staff  11/05/19    Name Role Begin End    Murray York M.D. Anesth 1902         Preop Diagnosis (Free Text):  Pre-op Diagnosis     Acute cholecystitis        Preop Diagnosis (Codes):    Post op Diagnosis  Cholecystitis, acute with cholelithiasis      Premium Reason  A. 3PM - 7AM    Comments:

## 2019-11-06 NOTE — DISCHARGE SUMMARY
Internal Medicine Discharge Summary  Note Author: Ubaldo Car M.D.       Name Semaj Jensen 1980   Age/Sex 39 y.o. male   MRN [id number]         Admit Date:  2019       Discharge Date:   2019    Service:   [facility] Internal Medicine Gray Team  Attending Physician(s):   Khadra      Senior Resident(s):   David   Deandre Resident(s):   Alise   PCP: Helen Daugherty M.D.      Primary Diagnosis:   Acute Cholecystitis     Secondary Diagnoses:                Active Problems:    Hypertriglyceridemia, familial POA: Unknown    Resolved Problems:    Acute cholecystitis POA: Unknown    Hypokalemia POA: Unknown    Hospital Summary (Brief Narrative):       40 yo M with past medical history of intermittent epigastric pain came to the ER with intractable nausea and vomiting x7-8 and epigastric pains started yesterday evening. He states nausea, vomiting is not related with food intake, marijuana. He describes epigastric pain as dull, 8/10, intermittent and states he was having the pain for months. He presented to the ER in September with the same symptoms and found cholelithiasis on ultrasound. In the ED patient had CT abdomen that showed gallstones and  RUQ US was positive for cholecystitis, gall bladder polyp. MRCP show cholelithiasis without any biliary obstruction. Patient admitted and started on IV antibiotics and placed NPO for surgery. Surgery was consulted, and he had laparoscopic cholecystectomy on 19. Next day patient was doing better, with minimal pain. He was hemodynamically stable and was cleared by surgery. Patient discharged in stable condition. Oriented to have follow up with PCP.       Patient /Hospital Summary (Details -- Problem Oriented) :          * Acute cholecystitis  Assessment & Plan  H/o intermittent epigastric pain.  Patient presented with epigastric pain / RUQ pain. No obstruction, peritoneal signs, rectal bleeding.  10/24 h.pylori negative. PPI given  without improvement.     On admission:  LFT was concerning for extrahepatic picture with elevated alkP. WBC elevated with neutrophylia.  Abdominal CT was positive for gallstones.  RUQ US was positive for cholecystitis, gall bladder polyp.   MRCP show cholelithiasis without any biliary obstruction.     Plan:  -Surgery was consulted, went cholecystectomy 7pm.   -Omeprazole for possible gastritis.  - Follow up with PCP       Hypokalemia  Assessment & Plan  Replace prn.  Possible GI loss.  Resolved    Hypertriglyceridemia, familial  Assessment & Plan  Presented with .  Family history of hypertriglyceridemia.  No hx of MI.    Plan:  -outpatient follow up w/ PCP.      Consultants:     Surgery     Procedures:        Laparoscopic Cholecystectomy     Imaging/ Testing:      [id number]/O   Final Result      1.  No biliary obstruction is identified.      2.  Cholelithiasis.      3.  Borderline thyromegaly.      4.  Nonspecific mildly enlarged periportal lymph nodes, likely reactive.      US-RUQ   Final Result         1.  Cholelithiasis and gallbladder sludge with gallbladder wall thickening, findings sonographic compatible with cholecystitis.   2.  Echogenic liver, compatible with fatty change versus fibrosis.   3.  Echogenic pancreas, consider pancreatitis as clinically appropriate.   4.  Nonshadowing echogenic foci along the nondependent gallbladder wall, appearance suggests gallbladder polyp      CT-ABDOMEN-PELVIS WITH   Final Result         1.  No acute abnormality.   2.  Cholelithiasis            Discharge Medications:         Medication Reconciliation: Completed       Medication List      CONTINUE taking these medications      Instructions   omeprazole 20 MG delayed-release capsule  Commonly known as:  PRILOSEC   Take 20 mg by mouth 2 Times a Day.  Dose:  20 mg            Can use .DISCHARGEMEDSLIST if going to another facility         Disposition:   Discharge home     Diet:   Regular     Activity:   As tolerated        Instructions:      The patient was instructed to return to the ER in the event of worsening symptoms. I have counseled the patient on the importance of compliance and the patient has agreed to proceed with all medical recommendations and follow up plan indicated above.   The patient understands that all medications come with benefits and risks. Risks may include permanent injury or death and these risks can be minimized with close reassessment and monitoring.        Primary Care Provider:  Discharge summary faxed to primary care provider:  Completed  Copy of discharge summary given to the patient: Completed      Follow up appointment details :      No future appointments.  Helen Daugherty M.D.  [address] [place] [id number]  [phone]    In 1 week          Pending Studies:        None     Time spent on discharge day patient visit, preparing discharge paperwork and arranging for patient follow up.    Discharge Time (Minutes) :    50min   Hospital Course Type: Inpatient Stay < 2 midnights, patient recovered more rapidly than anticipated      Condition on Discharge  Stable   ______________________________________________________________________    Interval history/exam for day of discharge:    No acute events overnight   Patient tolerated surgery well.   Denies fever, chills, abdominal pain, nausea, or vomiting. Tolerating diet and passing gas.   Discharge in stable condition.     Physical Exam   Constitutional:  oriented to person, place, and time. No distress.   HEENT: grossly normal   Cardiovascular: Normal heart rate, Normal rhythm   Lungs: Respiratory effort is normal. Normal breath sounds  Abdomen: Bowel sounds normal, Soft, No tenderness  Skin: No erythema, No rash  Lower limbs: normal, no pitting edema   Neurologic: Alert & oriented x 3,No focal deficits noted  PSY: stable mood.         Most Recent Labs:    Lab Results   Component Value Date/Time    WBC 10.9 (H) 11/06/2019 03:08 AM    RBC  4.87 11/06/2019 03:08 AM    HEMOGLOBIN 14.8 11/06/2019 03:08 AM    HEMATOCRIT 42.1 11/06/2019 03:08 AM    MCV 86.4 11/06/2019 03:08 AM    MCH 30.4 11/06/2019 03:08 AM    MCHC 35.2 11/06/2019 03:08 AM    MPV 10.3 11/06/2019 03:08 AM    NEUTSPOLYS 68.30 11/06/2019 03:08 AM    LYMPHOCYTES 20.30 (L) 11/06/2019 03:08 AM    MONOCYTES 6.90 11/06/2019 03:08 AM    EOSINOPHILS 3.60 11/06/2019 03:08 AM    BASOPHILS 0.50 11/06/2019 03:08 AM      Lab Results   Component Value Date/Time    SODIUM 138 11/06/2019 03:08 AM    POTASSIUM 3.7 11/06/2019 03:08 AM    CHLORIDE 107 11/06/2019 03:08 AM    CO2 24 11/06/2019 03:08 AM    GLUCOSE 93 11/06/2019 03:08 AM    BUN 11 11/06/2019 03:08 AM    CREATININE 0.90 11/06/2019 03:08 AM      Lab Results   Component Value Date/Time    ALTSGPT 83 (H) 11/06/2019 03:08 AM    ASTSGOT 49 (H) 11/06/2019 03:08 AM    ALKPHOSPHAT 101 (H) 11/06/2019 03:08 AM    TBILIRUBIN 1.9 (H) 11/06/2019 03:08 AM    LIPASE 41 11/05/2019 02:28 AM    ALBUMIN 3.7 11/06/2019 03:08 AM    GLOBULIN 2.5 11/06/2019 03:08 AM     No results found for: PROTHROMBTM, INR

## 2019-11-06 NOTE — PROGRESS NOTES
Assumed care of pt at 2145. Pt AOx4, neurologically intact. C/O pain, given tylenol 650mg with relief. Pt not up at this time. Lap sites X4 CDI. NS @ 83 to PIV. Tele in place, SR. Pt states he did not sleep, appeared to be resting comfortably with each rounding. Educated and encouraged to use call light for needs. No other issues this shift. Will continue to monitor and report to oncoming nurse.

## 2019-11-06 NOTE — CARE PLAN
Problem: Communication  Goal: The ability to communicate needs accurately and effectively will improve  Outcome: PROGRESSING AS EXPECTED     Problem: Safety  Goal: Will remain free from injury  Outcome: PROGRESSING AS EXPECTED  Goal: Will remain free from falls  Outcome: PROGRESSING AS EXPECTED     Problem: Infection  Goal: Will remain free from infection  Outcome: PROGRESSING AS EXPECTED     Problem: Pain Management  Goal: Pain level will decrease to patient's comfort goal  Outcome: PROGRESSING AS EXPECTED

## 2019-11-06 NOTE — DISCHARGE PLANNING
Care Transition Team Assessment    The information provided for this assessment was provided by pt and chart review. Pt confirmed the information on facesheet was accurate. Pt lives in a one story home with his wife and 3 children. Pt works fulltime. Pt states to have a good support system. Pt states to be independent with ADL's/IADL's prior to admit. Pt has a copay for medications. Pt uses the [facility] on [facility]. Pt denies substance abuse and mental health.     No discharge plan in place at the time of this assessment.       Information Source  Orientation : Oriented x 4  Information Given By: Patient  Informant's Name: (Semaj Jensen)  Who is responsible for making decisions for patient? : Patient    Elopement Risk  Legal Hold: No  Ambulatory or Self Mobile in Wheelchair: Yes  Disoriented: No  Psychiatric Symptoms: None  History of Wandering: No  Elopement this Admit: No  Vocalizing Wanting to Leave: No  Displays Behaviors, Body Language Wanting to Leave: No-Not at Risk for Elopement  Elopement Risk: Not at Risk for Elopement    Interdisciplinary Discharge Planning  Does Admitting Nurse Feel This Could be a Complex Discharge?: No  Lives with - Patient's Self Care Capacity: Spouse  Patient or legal guardian wants to designate a caregiver (see row info): No  Support Systems: Friends / Neighbors  Housing / Facility: 1 Story House(12 steps to front door)  Do You Take your Prescribed Medications Regularly: Yes  Able to Return to Previous ADL's: Yes  Mobility Issues: No  Prior Services: None  Patient Expects to be Discharged to:: Home  Assistance Needed: No  Durable Medical Equipment: Not Applicable    Discharge Preparedness  What is your plan after discharge?: Uncertain - pending medical team collaboration  What are your discharge supports?: Spouse  Prior Functional Level: Independent with Activities of Daily Living  Difficulity with ADLs: None  Difficulity with IADLs: None    Functional Assesment  Prior  Functional Level: Independent with Activities of Daily Living    Finances  Financial Barriers to Discharge: No  Prescription Coverage: Yes    Vision / Hearing Impairment  Right Eye Vision: Impaired, Wears Glasses  Left Eye Vision: Impaired, Wears Glasses    Domestic Abuse  Have you ever been the victim of abuse or violence?: No  Physical Abuse or Sexual Abuse: No  Verbal Abuse or Emotional Abuse: No  Possible Abuse Reported to:: Not Applicable    Psychological Assessment  History of Substance Abuse: None  History of Psychiatric Problems: No  Non-compliant with Treatment: No    Discharge Risks or Barriers  Discharge risks or barriers?: No    Anticipated Discharge Information  Anticipated discharge disposition: Discharge needs currently unknown

## 2019-11-06 NOTE — ANESTHESIA PROCEDURE NOTES
Airway  Date/Time: 11/5/2019 7:13 PM  Performed by: Murray York M.D.  Authorized by: Murray York M.D.     Location:  OR  Urgency:  Elective  Indications for Airway Management:  Anesthesia  Spontaneous Ventilation: absent    Sedation Level:  Deep  Preoxygenated: Yes    Patient Position:  Sniffing  Mask Difficulty Assessment:  1 - vent by mask  Final Airway Type:  Endotracheal airway  Final Endotracheal Airway:  ETT  Cuffed: Yes    Technique Used for Successful ETT Placement:  Direct laryngoscopy  Devices/Methods Used in Placement:  Cricoid pressure  Insertion Site:  Oral  Blade Type:  Brigitte  Laryngoscope Blade/Videolaryngoscope Blade Size:  3  ETT Size (mm):  7.0  Measured from:  Teeth  ETT to Teeth (cm):  19  Placement Verified by: auscultation and capnometry    Cormack-Lehane Classification:  Grade IIa - partial view of glottis  Number of Attempts at Approach:  1

## 2019-11-06 NOTE — PROGRESS NOTES
Pt educated on home care instructions, follow up, diet, medications. Verbalized understanding. Discharged with wife via wc. Belongings with pt.

## 2020-10-27 NOTE — PROGRESS NOTES
New Patient to Establish    Reason to establish: New patient to establish    CC:   Here to establish care with new primary care provider  Also has concerns about a persistent sore throat      HPI:   Patient is a 38-year-old male who presents today to establish care with new primary care provider, wants a physical exam, and also wants to address his persistent sore throat which has been on and off for some time now. Patient states left throat swelling, first time that he noticed this was last October and November timeframe which has come on and off a few times since that time.  Patient states that he also has ear aches on the left side and headaches as well.  He states that he had a cold and bad cough when this initially happened.  2-3 weeks ago patient states that he had another flare up, but attributes this to the smoke in the air at that time.  He states that his left neck was swollen once again just like when he first noticed it last fall.  Patient states that he gets headaches every now and then, but states that his left ear has been hurting a little bit more for the past 2-3 weeks.  Localization of this year ache is around in the back of the ear.  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  Since he first noticed the left throat swelling last fall patient states that he has been waking up in night sweats as well but says that that has also been going on for a few years.  Patient states that his appetite is okay, there is been no weight loss that he has noticed, and otherwise he has been fine.    Patient denies any past medical history.  In terms of past surgical history patient states he had ear tubes placed when he was a kid.  In terms of family history, there is extensive heart disease, cholesterol present on both sides of the family.  In terms of social history, patient denies any tobacco use, any illicit drug use, any use, and states that he uses  "alcohol every other week and will have about 2-3 drinks.  In terms of sexual history, patient states he is currently sexually active with his wife.  In terms of preventive screening, patient states that his last Tdap was in 2013 in [place].  He consented to release of medical records from [place].      There are no active problems to display for this patient.      History reviewed. No pertinent past medical history.    No current outpatient prescriptions on file.     No current facility-administered medications for this visit.        Allergies as of 08/20/2018   • (No Known Allergies)       Social History     Social History   • Marital status:      Spouse name: N/A   • Number of children: N/A   • Years of education: N/A     Occupational History   • Not on file.     Social History Main Topics   • Smoking status: Never Smoker   • Smokeless tobacco: Never Used   • Alcohol use Yes      Comment: 2-3 drinks every other week   • Drug use: No   • Sexual activity: Yes     Partners: Female      Comment: with wife     Other Topics Concern   • Not on file     Social History Narrative   • No narrative on file       Family History   Problem Relation Age of Onset   • Depression Mother    • Hyperlipidemia Father    • Heart Disease Brother    • Cancer Maternal Grandmother    • Heart Disease Maternal Grandmother    • Hyperlipidemia Maternal Grandmother    • Depression Maternal Grandmother    • Hyperlipidemia Maternal Grandfather    • Hyperlipidemia Paternal Grandmother    • Heart Disease Paternal Grandfather    • Hyperlipidemia Paternal Grandfather        History reviewed. No pertinent surgical history.    ROS: As per HPI. Additional pertinent symptoms as noted below.    Patient states some mild sore throat and pain 1 out of 10 in severity at this time  All others negative    /84   Pulse 80   Temp 36.8 °C (98.3 °F)   Resp 18   Ht 1.79 m (5' 10.47\")   Wt 87.6 kg (193 lb 3.2 oz)   SpO2 96%   BMI 27.35 kg/m² "     Physical Exam  General:  Alert and oriented, No apparent distress.    Eyes: Pupils equal and reactive. No scleral icterus.    Throat: Clear no erythema or exudates noted. Posterior erythema appreciated at back of throat no evidence of exudates or tonsillar swelling.    Neck: Supple. No lymphadenopathy noted. Left side bulge on neck appreciated consistent with possible left thyroid swelling.  Left neck/thyroid swelling tender to palpation.     Lungs: Clear to auscultation and percussion bilaterally.  Normal breathing effort.    Cardiovascular: Regular rate and rhythm. No murmurs, rubs or gallops.    Abdomen:  Benign. No rebound or guarding noted.  Normal bowel sounds.    Extremities: No clubbing, cyanosis, edema.  2+ bilateral distal peripheral pulses.    Skin: Clear. No rash or suspicious skin lesions noted.          Assessment and Plan    1. Sore throat  Patient states sore throat on and off for some time, recent flareup 2-3 weeks ago due to smoke in air  -Posterior erythema appreciated at back of throat no evidence of exudates or tonsillar swelling  -POCT rapid strep ordered in clinic-this resulted back negative  -Will continue to monitor       2. Enlargement of neck  Patient states left throat swelling, first time that he noticed this was last October and November timeframe which has come on and off a few times since that time.  -Left side bulge on neck appreciated consistent with possible left thyroid swelling.  Left neck/thyroid swelling tender to palpation.  -Labwork HIV antibody antigen screening [id number] at next office visit  -Ultrasound of the thyroid and neck ordered to rule out thyroid nodule/cancer  -If ultrasound thyroid is negative will consider CT neck for further evaluation to rule out malignancy/lymphoma and/or parotid swelling etiology at next visit  -Patient was informed to seek medical attention of signs and symptoms become worse  -Patient to follow-up in a few weeks for close  follow-up      3. Encounter for preventive care  Patient states that his last Tdap immunization was in 2013 in [place]  -Release of medical records requested from patient to make note of immunization and medical record      4. Screening for deficiency anemia  Patient has no baseline lab work at this time  -Labwork CBC ordered  -Will follow up at next office visit      5. Screening for thyroid disorder  Patient has no baseline lab work at this time  -Left side bulge on neck appreciated consistent with possible left thyroid swelling.  Left neck/thyroid swelling tender to palpation.  -Labwork TSH reflex T4 ordered  -Will follow-up at next office visit      6. Screening for hyperlipidemia  Patient has no baseline lab work at this time  -Patient has extensive family history of hyperlipidemia  -Labwork lipid profile ordered  -Will follow-up at next office visit      7. Encounter for vitamin deficiency screening  Patient has no baseline lab work at this time  -Patient states that in the past he was told he has vitamin D deficiency and was taking vitamin D supplements  -Labwork vitamin D, 25-hydroxy ordered  -Follow-up at next office visit      8. Screening for diabetes mellitus (DM)  Patient has no baseline lab work at this time  -Labwork fasting CMP ordered  -Follow up at next office visit        Signed by: Helen Daugherty M.D.   Price (Do Not Change): 0.00 Detail Level: Simple Instructions: This plan will send the code FBSE to the PM system.  DO NOT or CHANGE the price.

## 2020-11-14 ENCOUNTER — HOSPITAL ENCOUNTER (OUTPATIENT)
Dept: LAB | Facility: MEDICAL CENTER | Age: 40
End: 2020-11-14
Attending: PATHOLOGY
Payer: COMMERCIAL

## 2020-11-14 LAB — COVID ORDER STATUS COVID19: NORMAL

## 2020-11-15 LAB
SARS-COV-2 RNA RESP QL NAA+PROBE: NOTDETECTED
SPECIMEN SOURCE: NORMAL

## 2020-12-12 ENCOUNTER — HOSPITAL ENCOUNTER (OUTPATIENT)
Dept: LAB | Facility: MEDICAL CENTER | Age: 40
End: 2020-12-12
Attending: PATHOLOGY
Payer: COMMERCIAL

## 2020-12-13 LAB
COVID ORDER STATUS COVID19: NORMAL
SARS-COV-2 RNA RESP QL NAA+PROBE: NOTDETECTED
SPECIMEN SOURCE: NORMAL

## 2021-01-23 ENCOUNTER — HOSPITAL ENCOUNTER (OUTPATIENT)
Dept: LAB | Facility: MEDICAL CENTER | Age: 41
End: 2021-01-23
Payer: COMMERCIAL

## 2021-03-08 ENCOUNTER — HOSPITAL ENCOUNTER (OUTPATIENT)
Dept: LAB | Facility: MEDICAL CENTER | Age: 41
End: 2021-03-08
Payer: COMMERCIAL

## 2021-03-08 LAB
COVID ORDER STATUS COVID19: NORMAL
SARS-COV-2 RNA RESP QL NAA+PROBE: DETECTED
SPECIMEN SOURCE: ABNORMAL

## 2021-04-06 ENCOUNTER — TELEPHONE (OUTPATIENT)
Dept: SCHEDULING | Facility: IMAGING CENTER | Age: 41
End: 2021-04-06

## 2021-04-13 ENCOUNTER — IMMUNIZATION (OUTPATIENT)
Dept: FAMILY PLANNING/WOMEN'S HEALTH CLINIC | Facility: IMMUNIZATION CENTER | Age: 41
End: 2021-04-13
Payer: COMMERCIAL

## 2021-04-13 DIAGNOSIS — Z23 ENCOUNTER FOR VACCINATION: Primary | ICD-10-CM

## 2021-04-13 PROCEDURE — 91300 PFIZER SARS-COV-2 VACCINE: CPT

## 2021-04-13 PROCEDURE — 0001A PFIZER SARS-COV-2 VACCINE: CPT

## 2021-04-28 ENCOUNTER — OFFICE VISIT (OUTPATIENT)
Dept: MEDICAL GROUP | Facility: MEDICAL CENTER | Age: 41
End: 2021-04-28
Payer: COMMERCIAL

## 2021-04-28 VITALS
OXYGEN SATURATION: 99 % | BODY MASS INDEX: 28.14 KG/M2 | HEART RATE: 65 BPM | DIASTOLIC BLOOD PRESSURE: 80 MMHG | RESPIRATION RATE: 16 BRPM | SYSTOLIC BLOOD PRESSURE: 136 MMHG | TEMPERATURE: 98 F | WEIGHT: 201 LBS | HEIGHT: 71 IN

## 2021-04-28 DIAGNOSIS — E78.1 HYPERTRIGLYCERIDEMIA, FAMILIAL: ICD-10-CM

## 2021-04-28 DIAGNOSIS — Z02.82 ENCOUNTER FOR PHYSICAL EXAMINATION OF PROSPECTIVE ADOPTIVE PARENT: ICD-10-CM

## 2021-04-28 DIAGNOSIS — R10.84 GENERALIZED ABDOMINAL PAIN: ICD-10-CM

## 2021-04-28 DIAGNOSIS — K76.0 STEATOSIS OF LIVER: ICD-10-CM

## 2021-04-28 DIAGNOSIS — Z30.09 VASECTOMY EVALUATION: ICD-10-CM

## 2021-04-28 DIAGNOSIS — E55.9 VITAMIN D DEFICIENCY: ICD-10-CM

## 2021-04-28 PROBLEM — E78.5 ELEVATED FASTING LIPID PROFILE: Status: ACTIVE | Noted: 2019-09-17

## 2021-04-28 PROCEDURE — 99203 OFFICE O/P NEW LOW 30 MIN: CPT | Performed by: STUDENT IN AN ORGANIZED HEALTH CARE EDUCATION/TRAINING PROGRAM

## 2021-04-28 RX ORDER — OMEPRAZOLE 20 MG/1
20 CAPSULE, DELAYED RELEASE ORAL 2 TIMES DAILY
Qty: 30 CAPSULE | Refills: 3 | Status: SHIPPED | OUTPATIENT
Start: 2021-04-28 | End: 2021-05-11

## 2021-04-28 SDOH — HEALTH STABILITY: PHYSICAL HEALTH: ON AVERAGE, HOW MANY DAYS PER WEEK DO YOU ENGAGE IN MODERATE TO STRENUOUS EXERCISE (LIKE A BRISK WALK)?: 0 DAYS

## 2021-04-28 SDOH — ECONOMIC STABILITY: TRANSPORTATION INSECURITY
IN THE PAST 12 MONTHS, HAS THE LACK OF TRANSPORTATION KEPT YOU FROM MEDICAL APPOINTMENTS OR FROM GETTING MEDICATIONS?: NO

## 2021-04-28 SDOH — HEALTH STABILITY: MENTAL HEALTH
STRESS IS WHEN SOMEONE FEELS TENSE, NERVOUS, ANXIOUS, OR CAN'T SLEEP AT NIGHT BECAUSE THEIR MIND IS TROUBLED. HOW STRESSED ARE YOU?: ONLY A LITTLE

## 2021-04-28 SDOH — ECONOMIC STABILITY: HOUSING INSECURITY
IN THE LAST 12 MONTHS, WAS THERE A TIME WHEN YOU DID NOT HAVE A STEADY PLACE TO SLEEP OR SLEPT IN A SHELTER (INCLUDING NOW)?: NO

## 2021-04-28 SDOH — ECONOMIC STABILITY: HOUSING INSECURITY: IN THE LAST 12 MONTHS, HOW MANY PLACES HAVE YOU LIVED?: 2

## 2021-04-28 SDOH — HEALTH STABILITY: PHYSICAL HEALTH: ON AVERAGE, HOW MANY MINUTES DO YOU ENGAGE IN EXERCISE AT THIS LEVEL?: 0 MINUTES

## 2021-04-28 SDOH — ECONOMIC STABILITY: INCOME INSECURITY: IN THE LAST 12 MONTHS, WAS THERE A TIME WHEN YOU WERE NOT ABLE TO PAY THE MORTGAGE OR RENT ON TIME?: NO

## 2021-04-28 SDOH — ECONOMIC STABILITY: TRANSPORTATION INSECURITY
IN THE PAST 12 MONTHS, HAS LACK OF RELIABLE TRANSPORTATION KEPT YOU FROM MEDICAL APPOINTMENTS, MEETINGS, WORK OR FROM GETTING THINGS NEEDED FOR DAILY LIVING?: NO

## 2021-04-28 ASSESSMENT — FIBROSIS 4 INDEX: FIB4 SCORE: 0.92

## 2021-04-28 ASSESSMENT — SOCIAL DETERMINANTS OF HEALTH (SDOH)
HOW HARD IS IT FOR YOU TO PAY FOR THE VERY BASICS LIKE FOOD, HOUSING, MEDICAL CARE, AND HEATING?: NOT VERY HARD
DO YOU BELONG TO ANY CLUBS OR ORGANIZATIONS SUCH AS CHURCH GROUPS UNIONS, FRATERNAL OR ATHLETIC GROUPS, OR SCHOOL GROUPS?: YES
WITHIN THE PAST 12 MONTHS, YOU WORRIED THAT YOUR FOOD WOULD RUN OUT BEFORE YOU GOT THE MONEY TO BUY MORE: NEVER TRUE
HOW OFTEN DO YOU ATTENT MEETINGS OF THE CLUB OR ORGANIZATION YOU BELONG TO?: MORE THAN 4 TIMES PER YEAR
HOW OFTEN DO YOU GET TOGETHER WITH FRIENDS OR RELATIVES?: ONCE A WEEK
WITHIN THE PAST 12 MONTHS, THE FOOD YOU BOUGHT JUST DIDN'T LAST AND YOU DIDN'T HAVE MONEY TO GET MORE: NEVER TRUE
HOW OFTEN DO YOU HAVE SIX OR MORE DRINKS ON ONE OCCASION: NEVER
HOW OFTEN DO YOU HAVE A DRINK CONTAINING ALCOHOL: 2-4 TIMES A MONTH
HOW OFTEN DO YOU ATTEND CHURCH OR RELIGIOUS SERVICES?: MORE THAN 4 TIMES PER YEAR
HOW MANY DRINKS CONTAINING ALCOHOL DO YOU HAVE ON A TYPICAL DAY WHEN YOU ARE DRINKING: 1 OR 2
IN A TYPICAL WEEK, HOW MANY TIMES DO YOU TALK ON THE PHONE WITH FAMILY, FRIENDS, OR NEIGHBORS?: ONCE A WEEK

## 2021-04-28 ASSESSMENT — PATIENT HEALTH QUESTIONNAIRE - PHQ9: CLINICAL INTERPRETATION OF PHQ2 SCORE: 0

## 2021-04-28 NOTE — ASSESSMENT & PLAN NOTE
Patient continues to take vitamin D supplement 5000 units daily.  Patient continues to take vitamin B12 supplement 2500 daily.

## 2021-04-28 NOTE — ASSESSMENT & PLAN NOTE
Previous diagnosis of steatosis of liver.  Patient liver enzymes were elevated but this occurred during time of cholecystectomy.  Patient's labs have not been evaluated since recovery from cholecystectomy.  Will reevaluate.

## 2021-04-28 NOTE — PROGRESS NOTES
Subjective:     CC:  Diagnoses of Encounter for physical examination of prospective adoptive parent, Generalized abdominal pain, Hypertriglyceridemia, familial, Steatosis of liver, Vitamin D deficiency, and Vasectomy evaluation were pertinent to this visit.    HISTORY OF THE PRESENT ILLNESS: Patient is a 40 y.o. male. This pleasant patient is here today to establish care and discuss adopting, abdominal pain, overall health.. His prior PCP was with [facility] program.    Vitamin D deficiency  Patient continues to take vitamin D supplement 5000 units daily.  Patient continues to take vitamin B12 supplement 2500 daily.    Steatosis of liver  Previous diagnosis of steatosis of liver.  Patient liver enzymes were elevated but this occurred during time of cholecystectomy.  Patient's labs have not been evaluated since recovery from cholecystectomy.  Will reevaluate.    Hypertriglyceridemia, familial  Family history significant for hypertriglyceridemia.  Patient lipid panel checked 10/24/19 showed hypertriglyceridemia and low HDL.  We will recheck these values.    Generalized abdominal pain  Patient states generalized abdominal pain.  Patient states that this has been occurring since his cholecystectomy in 2019.  Patient was previously put on omeprazole 20 mg 2 times daily.  Patient states that he has stopped taking this for the last 2 months and is unsure of a difference.  Patient states that since he has stopped taking it though, he experiences nausea at nighttime.  Patient states that he will be laying down and have to get up in order to throw up.  Patient states that this occurs frequently but he cannot relate it to certain meals or eating late at night.  Patient is encouraged to track the occurrences depending on types of meals, how late he ate, and where the pain is.  Patient denies any change in bowel movements.  Patient denies any shortness of breath or chest pain.  Patient denies any stabbing pain or constant  "pain.    Encounter for physical examination of prospective adoptive parent  Patient presents with paperwork for physical exam.      Current Outpatient Medications Ordered in Epic   Medication Sig Dispense Refill   • omeprazole (PRILOSEC) 20 MG delayed-release capsule Take 1 capsule by mouth 2 times a day. 30 capsule 3     No current Psychiatric-ordered facility-administered medications on file.       Health Maintenance: Completed    ROS:   Gen: no fevers/chills, no changes in weight  Eyes: no changes in vision  ENT: no sore throat, no hearing loss, no bloody nose  Pulm: no sob, no cough  CV: no chest pain, no palpitations  GI: no nausea/vomiting, no diarrhea  : no dysuria  MSk: no myalgias  Skin: no rash  Neuro: no headaches, no numbness/tingling  Heme/Lymph: no easy bruising      Objective:     Vital signs within normal limits.  Blood pressure slightly elevated.    Exam: /80 (BP Location: Right arm, Patient Position: Sitting, BP Cuff Size: Adult)   Pulse 65   Temp 36.7 °C (98 °F)   Resp 16   Ht 1.803 m (5' 11\")   Wt 91.2 kg (201 lb)   SpO2 99%  Body mass index is 28.03 kg/m².    General: Normal appearing. No distress.  HEENT: Normocephalic. Eyes conjunctiva clear lids without ptosis, pupils equal and reactive to light accommodation, ears normal shape and contour  Neck: Supple without JVD. Thyroid is not enlarged.  Pulmonary: Clear to ausculation.  Normal effort. No rales, ronchi, or wheezing.  Cardiovascular: Regular rate and rhythm without murmur.   Abdomen: Soft, nontender, nondistended. Normal bowel sounds  Neurologic: Grossly nonfocal  Lymph: No cervical or supraclavicular lymph nodes are palpable  Skin: Warm and dry.  No obvious lesions.  Musculoskeletal: Normal gait. No extremity cyanosis, clubbing, or edema.  Psych: Normal mood and affect. Alert and oriented x3. Judgment and insight is normal.      Assessment & Plan:   40 y.o. male with the following -    1. Encounter for physical examination of " prospective adoptive parent  Patient presents today for paperwork to determine physical and mental wellbeing of perspective adoptive parent.  Patient has no medical conditions that would enable him for caring for a child.  Patient has no previous health mental health diagnoses.  Physical exam revealed no significant findings.  This is my first visit with the patient but I am able to look at previous records and do not see any concerning diagnoses.    2. Generalized abdominal pain  Chronic, stable.  Patient is no longer taking omeprazole 20 mg daily.  Patient states that he does not feel a difference.  Patient does note that he frequently feels nauseous at night when he goes to lay down.  Patient states that it frequently leads to vomiting and once he vomits he will feel better.  On physical exam abdomen is soft, nontender, no guarding, no distention, no rebound tenderness.  Patient is encouraged to keep a log and determine what kind of foods or what time and night he is eating that is setting the symptoms off.  Patient is also encouraged to start taking omeprazole again due to the possibility that this is acid reflux.  Will redraw labs to evaluate liver, pancreas, CBC, CMP.    3. Hypertriglyceridemia, familial  Chronic, untreated.  Previous labs in 2019 showed hypertriglyceridemia and low HDL.  Will reevaluate.  - LIPASE; Future  - Lipid Profile; Future    4. Steatosis of liver  Chronic, unknown.  Previous diagnosis of steatosis of liver with elevated liver enzymes.  This was during the time of cholecystectomy and has not since been repeated.  Will reevaluate.  - Comp Metabolic Panel; Future  - Lipid Profile; Future  - LIPASE; Future  - CBC WITH DIFFERENTIAL; Future    5. Vitamin D deficiency  - VITAMIN D,25 HYDROXY; Future    6. Vasectomy evaluation  - REFERRAL TO UROLOGY      Return in about 6 months (around 10/28/2021).    Please note that this dictation was created using voice recognition software. I have made  every reasonable attempt to correct obvious errors, but I expect that there are errors of grammar and possibly content that I did not discover before finalizing the note.

## 2021-04-28 NOTE — PROGRESS NOTES
"Subjective:     CC:  There were no encounter diagnoses.    HISTORY OF THE PRESENT ILLNESS: Patient is a 40 y.o. male. This pleasant patient is here today to establish care and discuss ***. His/her prior PCP was ***.    No problem-specific Assessment & Plan notes found for this encounter.      Current Outpatient Medications Ordered in Epic   Medication Sig Dispense Refill   • omeprazole (PRILOSEC) 20 MG delayed-release capsule Take 20 mg by mouth 2 Times a Day.  3     No current [facility]-ordered facility-administered medications on file.       Health Maintenance: {COMPLETED:518853}    ROS:   Gen: no fevers/chills, no changes in weight  Eyes: no changes in vision  ENT: no sore throat, no hearing loss, no bloody nose  Pulm: no sob, no cough  CV: no chest pain, no palpitations  GI: no nausea/vomiting, no diarrhea  : no dysuria  MSk: no myalgias  Skin: no rash  Neuro: no headaches, no numbness/tingling  Heme/Lymph: no easy bruising      Objective:     ***  Exam: /80 (BP Location: Right arm, Patient Position: Sitting, BP Cuff Size: Adult)   Pulse 65   Temp 36.7 °C (98 °F)   Resp 16   Ht 1.803 m (5' 11\")   Wt 91.2 kg (201 lb)   SpO2 99%  Body mass index is 28.03 kg/m².    General: Normal appearing. No distress.  HEENT: Normocephalic. Eyes conjunctiva clear lids without ptosis, pupils equal and reactive to light accommodation, ears normal shape and contour, canals are clear bilaterally, tympanic membranes are benign, nasal mucosa benign, oropharynx is without erythema, edema or exudates.   Neck: Supple without JVD or bruit. Thyroid is not enlarged.  Pulmonary: Clear to ausculation.  Normal effort. No rales, ronchi, or wheezing.  Cardiovascular: Regular rate and rhythm without murmur. Carotid and radial pulses are intact and equal bilaterally.  Abdomen: Soft, nontender, nondistended. Normal bowel sounds. Liver and spleen are not palpable  Neurologic: Grossly nonfocal  Lymph: No cervical or supraclavicular lymph " nodes are palpable  Skin: Warm and dry.  No obvious lesions.  Musculoskeletal: Normal gait. No extremity cyanosis, clubbing, or edema.  Psych: Normal mood and affect. Alert and oriented x3. Judgment and insight is normal.  ***    A chaperone was offered to the patient during today's exam. {CHAPERONE:42941}    Labs: ***    Assessment & Plan:   40 y.o. male with the following -    There are no diagnoses linked to this encounter.    I spent a total of *** minutes with record review, exam, communication with the patient, communication with other providers, and documentation of this encounter.    No follow-ups on file.    Please note that this dictation was created using voice recognition software. I have made every reasonable attempt to correct obvious errors, but I expect that there are errors of grammar and possibly content that I did not discover before finalizing the note.

## 2021-04-28 NOTE — ASSESSMENT & PLAN NOTE
Patient states generalized abdominal pain.  Patient states that this has been occurring since his cholecystectomy in 2019.  Patient was previously put on omeprazole 20 mg 2 times daily.  Patient states that he has stopped taking this for the last 2 months and is unsure of a difference.  Patient states that since he has stopped taking it though, he experiences nausea at nighttime.  Patient states that he will be laying down and have to get up in order to throw up.  Patient states that this occurs frequently but he cannot relate it to certain meals or eating late at night.  Patient is encouraged to track the occurrences depending on types of meals, how late he ate, and where the pain is.  Patient denies any change in bowel movements.  Patient denies any shortness of breath or chest pain.  Patient denies any stabbing pain or constant pain.

## 2021-04-28 NOTE — ASSESSMENT & PLAN NOTE
Family history significant for hypertriglyceridemia.  Patient lipid panel checked 10/24/19 showed hypertriglyceridemia and low HDL.  We will recheck these values.

## 2021-05-06 ENCOUNTER — IMMUNIZATION (OUTPATIENT)
Dept: FAMILY PLANNING/WOMEN'S HEALTH CLINIC | Facility: IMMUNIZATION CENTER | Age: 41
End: 2021-05-06
Payer: COMMERCIAL

## 2021-05-06 DIAGNOSIS — Z23 ENCOUNTER FOR VACCINATION: Primary | ICD-10-CM

## 2021-05-06 PROCEDURE — 91300 PFIZER SARS-COV-2 VACCINE: CPT | Performed by: INTERNAL MEDICINE

## 2021-05-06 PROCEDURE — 0002A PFIZER SARS-COV-2 VACCINE: CPT | Performed by: INTERNAL MEDICINE

## 2021-05-11 RX ORDER — OMEPRAZOLE 20 MG/1
20 CAPSULE, DELAYED RELEASE ORAL DAILY
Qty: 90 CAPSULE | Refills: 3 | Status: SHIPPED | OUTPATIENT
Start: 2021-05-11 | End: 2022-01-27

## 2022-01-27 ENCOUNTER — PHARMACY VISIT (OUTPATIENT)
Dept: PHARMACY | Facility: MEDICAL CENTER | Age: 42
End: 2022-01-27
Payer: COMMERCIAL

## 2022-01-27 ENCOUNTER — OFFICE VISIT (OUTPATIENT)
Dept: MEDICAL GROUP | Facility: MEDICAL CENTER | Age: 42
End: 2022-01-27
Payer: COMMERCIAL

## 2022-01-27 VITALS
HEIGHT: 71 IN | BODY MASS INDEX: 27.99 KG/M2 | SYSTOLIC BLOOD PRESSURE: 112 MMHG | TEMPERATURE: 97.7 F | WEIGHT: 199.96 LBS | OXYGEN SATURATION: 96 % | DIASTOLIC BLOOD PRESSURE: 72 MMHG | HEART RATE: 78 BPM

## 2022-01-27 DIAGNOSIS — K76.0 STEATOSIS OF LIVER: ICD-10-CM

## 2022-01-27 DIAGNOSIS — R10.84 GENERALIZED ABDOMINAL PAIN: ICD-10-CM

## 2022-01-27 DIAGNOSIS — E78.1 HYPERTRIGLYCERIDEMIA, FAMILIAL: ICD-10-CM

## 2022-01-27 PROBLEM — K81.0 ACUTE CHOLECYSTITIS: Status: RESOLVED | Noted: 2019-11-05 | Resolved: 2022-01-27

## 2022-01-27 PROCEDURE — RXMED WILLOW AMBULATORY MEDICATION CHARGE: Performed by: INTERNAL MEDICINE

## 2022-01-27 PROCEDURE — 99213 OFFICE O/P EST LOW 20 MIN: CPT | Performed by: STUDENT IN AN ORGANIZED HEALTH CARE EDUCATION/TRAINING PROGRAM

## 2022-01-27 RX ORDER — RNA INGREDIENT BNT-162B2 0.23 G/1.8ML
0.3 INJECTION, SUSPENSION INTRAMUSCULAR
Qty: 0.3 ML | Refills: 0 | OUTPATIENT
Start: 2022-01-27

## 2022-01-27 RX ORDER — PANTOPRAZOLE SODIUM 40 MG/1
40 TABLET, DELAYED RELEASE ORAL DAILY
Qty: 30 TABLET | Refills: 1 | Status: SHIPPED | OUTPATIENT
Start: 2022-01-27 | End: 2022-03-02

## 2022-01-27 SDOH — ECONOMIC STABILITY: FOOD INSECURITY: WITHIN THE PAST 12 MONTHS, YOU WORRIED THAT YOUR FOOD WOULD RUN OUT BEFORE YOU GOT MONEY TO BUY MORE.: NEVER TRUE

## 2022-01-27 SDOH — ECONOMIC STABILITY: INCOME INSECURITY: IN THE LAST 12 MONTHS, WAS THERE A TIME WHEN YOU WERE NOT ABLE TO PAY THE MORTGAGE OR RENT ON TIME?: NO

## 2022-01-27 SDOH — HEALTH STABILITY: MENTAL HEALTH
STRESS IS WHEN SOMEONE FEELS TENSE, NERVOUS, ANXIOUS, OR CAN'T SLEEP AT NIGHT BECAUSE THEIR MIND IS TROUBLED. HOW STRESSED ARE YOU?: TO SOME EXTENT

## 2022-01-27 SDOH — HEALTH STABILITY: PHYSICAL HEALTH: ON AVERAGE, HOW MANY DAYS PER WEEK DO YOU ENGAGE IN MODERATE TO STRENUOUS EXERCISE (LIKE A BRISK WALK)?: 2 DAYS

## 2022-01-27 SDOH — ECONOMIC STABILITY: TRANSPORTATION INSECURITY
IN THE PAST 12 MONTHS, HAS LACK OF TRANSPORTATION KEPT YOU FROM MEETINGS, WORK, OR FROM GETTING THINGS NEEDED FOR DAILY LIVING?: NO

## 2022-01-27 SDOH — ECONOMIC STABILITY: FOOD INSECURITY: WITHIN THE PAST 12 MONTHS, THE FOOD YOU BOUGHT JUST DIDN'T LAST AND YOU DIDN'T HAVE MONEY TO GET MORE.: NEVER TRUE

## 2022-01-27 SDOH — HEALTH STABILITY: PHYSICAL HEALTH: ON AVERAGE, HOW MANY MINUTES DO YOU ENGAGE IN EXERCISE AT THIS LEVEL?: 60 MIN

## 2022-01-27 SDOH — ECONOMIC STABILITY: INCOME INSECURITY: HOW HARD IS IT FOR YOU TO PAY FOR THE VERY BASICS LIKE FOOD, HOUSING, MEDICAL CARE, AND HEATING?: NOT VERY HARD

## 2022-01-27 SDOH — ECONOMIC STABILITY: HOUSING INSECURITY: IN THE LAST 12 MONTHS, HOW MANY PLACES HAVE YOU LIVED?: 1

## 2022-01-27 ASSESSMENT — SOCIAL DETERMINANTS OF HEALTH (SDOH)
HOW OFTEN DO YOU ATTENT MEETINGS OF THE CLUB OR ORGANIZATION YOU BELONG TO?: MORE THAN 4 TIMES PER YEAR
HOW OFTEN DO YOU ATTEND CHURCH OR RELIGIOUS SERVICES?: MORE THAN 4 TIMES PER YEAR
IN A TYPICAL WEEK, HOW MANY TIMES DO YOU TALK ON THE PHONE WITH FAMILY, FRIENDS, OR NEIGHBORS?: ONCE A WEEK
HOW OFTEN DO YOU ATTENT MEETINGS OF THE CLUB OR ORGANIZATION YOU BELONG TO?: MORE THAN 4 TIMES PER YEAR
HOW HARD IS IT FOR YOU TO PAY FOR THE VERY BASICS LIKE FOOD, HOUSING, MEDICAL CARE, AND HEATING?: NOT VERY HARD
DO YOU BELONG TO ANY CLUBS OR ORGANIZATIONS SUCH AS CHURCH GROUPS UNIONS, FRATERNAL OR ATHLETIC GROUPS, OR SCHOOL GROUPS?: YES
HOW OFTEN DO YOU HAVE SIX OR MORE DRINKS ON ONE OCCASION: NEVER
IN A TYPICAL WEEK, HOW MANY TIMES DO YOU TALK ON THE PHONE WITH FAMILY, FRIENDS, OR NEIGHBORS?: ONCE A WEEK
HOW OFTEN DO YOU ATTEND CHURCH OR RELIGIOUS SERVICES?: MORE THAN 4 TIMES PER YEAR
WITHIN THE PAST 12 MONTHS, YOU WORRIED THAT YOUR FOOD WOULD RUN OUT BEFORE YOU GOT THE MONEY TO BUY MORE: NEVER TRUE
HOW OFTEN DO YOU GET TOGETHER WITH FRIENDS OR RELATIVES?: ONCE A WEEK
HOW OFTEN DO YOU GET TOGETHER WITH FRIENDS OR RELATIVES?: ONCE A WEEK
DO YOU BELONG TO ANY CLUBS OR ORGANIZATIONS SUCH AS CHURCH GROUPS UNIONS, FRATERNAL OR ATHLETIC GROUPS, OR SCHOOL GROUPS?: YES
HOW MANY DRINKS CONTAINING ALCOHOL DO YOU HAVE ON A TYPICAL DAY WHEN YOU ARE DRINKING: 1 OR 2
HOW OFTEN DO YOU HAVE A DRINK CONTAINING ALCOHOL: MONTHLY OR LESS

## 2022-01-27 ASSESSMENT — LIFESTYLE VARIABLES
HOW MANY STANDARD DRINKS CONTAINING ALCOHOL DO YOU HAVE ON A TYPICAL DAY: 1 OR 2
HOW OFTEN DO YOU HAVE SIX OR MORE DRINKS ON ONE OCCASION: NEVER
HOW OFTEN DO YOU HAVE A DRINK CONTAINING ALCOHOL: MONTHLY OR LESS

## 2022-01-27 ASSESSMENT — PATIENT HEALTH QUESTIONNAIRE - PHQ9: CLINICAL INTERPRETATION OF PHQ2 SCORE: 0

## 2022-01-27 NOTE — PROGRESS NOTES
"Subjective:     CC: Epigastric pain    HPI:   Semaj presents today for annual exam.    Epigastric pain  Patient continues to note epigastric pain that wakes him up around 4 AM.  Patient states that he has started back on the omeprazole 20 mg and try taking both in the morning and in the evening with no relief or improvement in this pain.  Patient notes no changes in bowel movement or urinary changes.  Patient notes no shortness of breath and no change in pain with exertion or exercise.    Patient saw urology and had vasectomy since last visit.  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    Scheduled for COVID-19 booster and flu shot today.    Patient has not completed labs on last visit.  Patient encouraged to get labs to further evaluate hypertriglyceridemia, hypokalemia, vitamin D deficiency, and liver steatosis.  Patient notes that he recently donated blood and was told that he could not donate for another 6 months.  Patient unsure of reason but will message over information.    ROS:  Gen: no fevers/chills  Pulm: no sob, no cough  CV: no chest pain, no palpitations  GI: no nausea/vomiting, no diarrhea  : no dysuria  Neuro: no headache      Objective:     Exam:  /72 (BP Location: Right arm, Patient Position: Sitting, BP Cuff Size: Adult)   Pulse 78   Temp 36.5 °C (97.7 °F) (Temporal)   Ht 1.803 m (5' 11\")   Wt 90.7 kg (199 lb 15.3 oz)   SpO2 96%   BMI 27.89 kg/m²  Body mass index is 27.89 kg/m².    Gen: Alert and oriented, No apparent distress.  Neck: Neck is supple without lymphadenopathy.  Lungs: Normal effort, CTA bilaterally, no wheezes, rhonchi, or rales  CV: Regular rate and rhythm. No murmurs, rubs, or gallops.  Ext: No clubbing, cyanosis, edema.      Assessment & Plan:     41 y.o. male with the following -     1. Generalized abdominal pain  Chronic, uncontrolled.  Patient continues to note epigastric pain despite omeprazole and avoidance of spicy foods, caffeine, eating " prior to bedtime.  Will refer to GI for further evaluation of pain.  Patient encouraged to get labs for further evaluation.  - Referral to Gastroenterology    2. Hypertriglyceridemia, familial  - Comp Metabolic Panel; Future  - CBC WITHOUT DIFFERENTIAL; Future  - Lipid Profile; Future  - TSH; Future  - VITAMIN D,25 HYDROXY; Future    3. Steatosis of liver  - Comp Metabolic Panel; Future  - CBC WITHOUT DIFFERENTIAL; Future  - Lipid Profile; Future  - TSH; Future  - VITAMIN D,25 HYDROXY; Future    Return in about 6 months (around 7/27/2022).    Please note that this dictation was created using voice recognition software. I have made every reasonable attempt to correct obvious errors, but I expect that there are errors of grammar and possibly content that I did not discover before finalizing the note.

## 2022-07-22 ENCOUNTER — PHARMACY VISIT (OUTPATIENT)
Dept: PHARMACY | Facility: MEDICAL CENTER | Age: 42
End: 2022-07-22
Payer: COMMERCIAL

## 2022-07-22 PROCEDURE — RXMED WILLOW AMBULATORY MEDICATION CHARGE: Performed by: INTERNAL MEDICINE

## 2022-07-22 RX ORDER — CLOSTRIDIUM TETANI TOXOID ANTIGEN (FORMALDEHYDE INACTIVATED), CORYNEBACTERIUM DIPHTHERIAE TOXOID ANTIGEN (FORMALDEHYDE INACTIVATED), BORDETELLA PERTUSSIS TOXOID ANTIGEN (GLUTARALDEHYDE INACTIVATED), BORDETELLA PERTUSSIS FILAMENTOUS HEMAGGLUTININ ANTIGEN (FORMALDEHYDE INACTIVATED), BORDETELLA PERTUSSIS PERTACTIN ANTIGEN, AND BORDETELLA PERTUSSIS FIMBRIAE 2/3 ANTIGEN 5; 2; 2.5; 5; 3; 5 [LF]/.5ML; [LF]/.5ML; UG/.5ML; UG/.5ML; UG/.5ML; UG/.5ML
0.5 INJECTION, SUSPENSION INTRAMUSCULAR
Qty: 0.5 ML | Refills: 0 | OUTPATIENT
Start: 2022-07-22

## 2024-01-12 ENCOUNTER — DOCUMENTATION (OUTPATIENT)
Dept: HEALTH INFORMATION MANAGEMENT | Facility: OTHER | Age: 44
End: 2024-01-12

## 2024-11-18 NOTE — OR NURSING
Pt is alert and oriented. No complaints of pain. Pt has 4 lap stabs across abdomen with dermabond closure, CDI, ice applied to site. VSS. Pt's wife, Angela, was called and updated on pt's status and that he will be returning to his room at [address] once recovery is complete. All questions answered, no other needs at this time.    18-Nov-2024 03:26

## (undated) DEVICE — ELECTRODE DUAL RETURN W/ CORD - (50/PK)

## (undated) DEVICE — MASK ANESTHESIA ADULT  - (100/CA)

## (undated) DEVICE — SUTURE GENERAL

## (undated) DEVICE — CANNULA W/SEAL 5X100 Z-THRE - ADED KII (12/BX)

## (undated) DEVICE — KIT ANESTHESIA W/CIRCUIT & 3/LT BAG W/FILTER (20EA/CA)

## (undated) DEVICE — GLOVE BIOGEL SZ 6.5 SURGICAL PF LTX (50PR/BX 4BX/CA)

## (undated) DEVICE — BAG RETRIEVAL 10ML (10EA/BX)

## (undated) DEVICE — TROCAR 5X100 NON BLADED Z-TH - READ KII (6/BX)

## (undated) DEVICE — SET EXTENSION WITH 2 PORTS (48EA/CA) ***PART #2C8610 IS A SUBSTITUTE*****

## (undated) DEVICE — GLOVE BIOGEL INDICATOR SZ 6.5 SURGICAL PF LTX - (50PR/BX 4BX/CA)

## (undated) DEVICE — NEEDLE INSFL 120MM 14GA VRRS - (20/BX)

## (undated) DEVICE — TUBE CONNECT SUCTION CLEAR 120 X 1/4" (50EA/CA)"

## (undated) DEVICE — KIT ROOM DECONTAMINATION

## (undated) DEVICE — CHLORAPREP 26 ML APPLICATOR - ORANGE TINT(25/CA)

## (undated) DEVICE — DRAPESURG STERI-DRAPE LONG - (10/BX 4BX/CA)

## (undated) DEVICE — DETERGENT RENUZYME PLUS 10 OZ PACKET (50/BX)

## (undated) DEVICE — NEPTUNE 4 PORT MANIFOLD - (20/PK)

## (undated) DEVICE — TUBING CLEARLINK DUO-VENT - C-FLO (48EA/CA)

## (undated) DEVICE — TUBING INSUFFLATION - (10/BX)

## (undated) DEVICE — GLOVE BIOGEL SZ 8 SURGICAL PF LTX - (50PR/BX 4BX/CA)

## (undated) DEVICE — SUTURE 4-0 MONOCRYL PLUS PS-2 - 27 INCH (36/BX)

## (undated) DEVICE — TROCAR Z THREAD 12 X 100 - BLADED (6/BX)

## (undated) DEVICE — HANDPIECE THUNDERBEAT 5MM 35CM FRONT GRIP TYPE S (5EA/BX)

## (undated) DEVICE — PROTECTOR ULNA NERVE - (36PR/CA)

## (undated) DEVICE — CLIP MED LG INTNL HRZN TI ESCP - (20/BX)

## (undated) DEVICE — TUBE E-T HI-LO CUFF 7.5MM (10EA/PK)

## (undated) DEVICE — SET SUCTION/IRRIGATION WITH DISPOSABLE TIP (6/CA )PART #0250-070-520 IS A SUB

## (undated) DEVICE — DERMABOND ADVANCED - (12EA/BX)

## (undated) DEVICE — SET LEADWIRE 5 LEAD BEDSIDE DISPOSABLE ECG (1SET OF 5/EA)

## (undated) DEVICE — PACK LAP CHOLE OR - (2EA/CA)

## (undated) DEVICE — SUTURE 0 VICRYL PLUS UR-6 - 27 INCH (36/BX)

## (undated) DEVICE — GLOVE BIOGEL SZ 7.5 SURGICAL PF LTX - (50PR/BX 4BX/CA)

## (undated) DEVICE — SUCTION INSTRUMENT YANKAUER BULBOUS TIP W/O VENT (50EA/CA)

## (undated) DEVICE — LACTATED RINGERS INJ 1000 ML - (14EA/CA 60CA/PF)

## (undated) DEVICE — SENSOR SPO2 NEO LNCS ADHESIVE (20/BX) SEE USER NOTES

## (undated) DEVICE — GOWN WARMING STANDARD FLEX - (30/CA)

## (undated) DEVICE — GLOVE BIOGEL PI INDICATOR SZ 7.5 SURGICAL PF LF -(50/BX 4BX/CA)

## (undated) DEVICE — HEAD HOLDER JUNIOR/ADULT

## (undated) DEVICE — WATER IRRIG. STER. 1500 ML - (9/CA)

## (undated) DEVICE — ELECTRODE 850 FOAM ADHESIVE - HYDROGEL RADIOTRNSPRNT (50/PK)

## (undated) DEVICE — CANISTER SUCTION 3000ML MECHANICAL FILTER AUTO SHUTOFF MEDI-VAC NONSTERILE LF DISP  (40EA/CA)

## (undated) DEVICE — SODIUM CHL IRRIGATION 0.9% 1000ML (12EA/CA)